# Patient Record
Sex: MALE | Race: WHITE | URBAN - METROPOLITAN AREA
[De-identification: names, ages, dates, MRNs, and addresses within clinical notes are randomized per-mention and may not be internally consistent; named-entity substitution may affect disease eponyms.]

---

## 2019-05-14 ENCOUNTER — OFFICE VISIT (OUTPATIENT)
Dept: INTERNAL MEDICINE CLINIC | Age: 64
End: 2019-05-14

## 2019-05-14 VITALS
TEMPERATURE: 98.4 F | BODY MASS INDEX: 32.37 KG/M2 | OXYGEN SATURATION: 96 % | HEIGHT: 72 IN | HEART RATE: 107 BPM | DIASTOLIC BLOOD PRESSURE: 94 MMHG | WEIGHT: 239 LBS | SYSTOLIC BLOOD PRESSURE: 171 MMHG | RESPIRATION RATE: 16 BRPM

## 2019-05-14 DIAGNOSIS — G89.4 CHRONIC PAIN SYNDROME: ICD-10-CM

## 2019-05-14 DIAGNOSIS — G47.30 SLEEP APNEA, UNSPECIFIED TYPE: ICD-10-CM

## 2019-05-14 DIAGNOSIS — I10 ESSENTIAL HYPERTENSION: ICD-10-CM

## 2019-05-14 DIAGNOSIS — J44.9 CHRONIC OBSTRUCTIVE PULMONARY DISEASE, UNSPECIFIED COPD TYPE (HCC): ICD-10-CM

## 2019-05-14 DIAGNOSIS — L40.9 PSORIASIS: ICD-10-CM

## 2019-05-14 DIAGNOSIS — J44.9 CHRONIC OBSTRUCTIVE PULMONARY DISEASE, UNSPECIFIED COPD TYPE (HCC): Primary | ICD-10-CM

## 2019-05-14 RX ORDER — FLUTICASONE PROPIONATE AND SALMETEROL 250; 50 UG/1; UG/1
1 POWDER RESPIRATORY (INHALATION) EVERY 12 HOURS
Qty: 1 INHALER | Refills: 1 | Status: SHIPPED | OUTPATIENT
Start: 2019-05-14 | End: 2019-07-16 | Stop reason: ALTCHOICE

## 2019-05-14 RX ORDER — FLUTICASONE PROPIONATE AND SALMETEROL 250; 50 UG/1; UG/1
1 POWDER RESPIRATORY (INHALATION) EVERY 12 HOURS
COMMUNITY
End: 2019-05-14 | Stop reason: SDUPTHER

## 2019-05-14 RX ORDER — ALBUTEROL SULFATE 90 UG/1
2 AEROSOL, METERED RESPIRATORY (INHALATION)
COMMUNITY
End: 2019-05-14 | Stop reason: SDUPTHER

## 2019-05-14 RX ORDER — ALBUTEROL SULFATE 90 UG/1
2 AEROSOL, METERED RESPIRATORY (INHALATION)
Qty: 1 INHALER | Refills: 1 | Status: SHIPPED | OUTPATIENT
Start: 2019-05-14 | End: 2019-12-12 | Stop reason: SDUPTHER

## 2019-05-14 RX ORDER — ALBUTEROL SULFATE 0.83 MG/ML
2.5 SOLUTION RESPIRATORY (INHALATION)
COMMUNITY
End: 2019-05-24 | Stop reason: SDUPTHER

## 2019-05-14 RX ORDER — LISINOPRIL 10 MG/1
10 TABLET ORAL DAILY
Qty: 30 TAB | Refills: 1 | Status: SHIPPED | OUTPATIENT
Start: 2019-05-14 | End: 2019-07-16 | Stop reason: SDUPTHER

## 2019-05-14 RX ORDER — FLUTICASONE PROPIONATE AND SALMETEROL 250; 50 UG/1; UG/1
1 POWDER RESPIRATORY (INHALATION) EVERY 12 HOURS
Qty: 1 INHALER | Refills: 1 | Status: SHIPPED | OUTPATIENT
Start: 2019-05-14 | End: 2019-05-14 | Stop reason: SDUPTHER

## 2019-05-14 RX ORDER — ZOLPIDEM TARTRATE 5 MG/1
TABLET ORAL
COMMUNITY
End: 2019-07-16 | Stop reason: ALTCHOICE

## 2019-05-14 RX ORDER — MONTELUKAST SODIUM 10 MG/1
10 TABLET ORAL DAILY
COMMUNITY
End: 2019-05-14 | Stop reason: SDUPTHER

## 2019-05-14 RX ORDER — MONTELUKAST SODIUM 10 MG/1
10 TABLET ORAL DAILY
Qty: 30 TAB | Refills: 1 | Status: SHIPPED | OUTPATIENT
Start: 2019-05-14 | End: 2019-05-14 | Stop reason: SDUPTHER

## 2019-05-14 RX ORDER — LORAZEPAM 0.5 MG/1
0.5 TABLET ORAL DAILY
COMMUNITY
End: 2019-07-16 | Stop reason: ALTCHOICE

## 2019-05-14 RX ORDER — OXYCODONE HYDROCHLORIDE 10 MG/1
10 TABLET ORAL DAILY
COMMUNITY
End: 2019-07-16 | Stop reason: ALTCHOICE

## 2019-05-14 RX ORDER — LISINOPRIL 10 MG/1
10 TABLET ORAL DAILY
COMMUNITY
End: 2019-05-14 | Stop reason: SDUPTHER

## 2019-05-14 RX ORDER — LISINOPRIL 10 MG/1
10 TABLET ORAL DAILY
Qty: 30 TAB | Refills: 1 | Status: SHIPPED | OUTPATIENT
Start: 2019-05-14 | End: 2019-08-08 | Stop reason: SDUPTHER

## 2019-05-14 RX ORDER — ALBUTEROL SULFATE 90 UG/1
2 AEROSOL, METERED RESPIRATORY (INHALATION)
Qty: 1 INHALER | Refills: 1 | Status: SHIPPED | OUTPATIENT
Start: 2019-05-14 | End: 2019-05-14 | Stop reason: SDUPTHER

## 2019-05-14 RX ORDER — MONTELUKAST SODIUM 10 MG/1
10 TABLET ORAL DAILY
Qty: 30 TAB | Refills: 1 | Status: SHIPPED | OUTPATIENT
Start: 2019-05-14 | End: 2019-08-08 | Stop reason: SDUPTHER

## 2019-05-14 NOTE — PROGRESS NOTES
Patient coming to this office from [place] and [place], will call back with information, this office close to home, sister Ananda Amaya) in room with patient. Patient has been off BP meds for 1 year. Patient takes Triamcinolone cream, does not know the strength or cream/lotion. Need Pulmonologist, lungs are bad.  Needs a Dermatologist.

## 2019-05-14 NOTE — TELEPHONE ENCOUNTER
Regina Veliz (sister) is requesting a her brothers Rx's be switched to [facility] [phone]). Best contact is [phone].      jan

## 2019-05-15 NOTE — PROGRESS NOTES
HISTORY OF PRESENT ILLNESS  Celso Brar is a 60 y.o. male. HPI  Patient presents to the office to get established. He has been here in [place] for about one month. He was living in [place] but he no longer had a support system so he moved to [place] with his sister. Before living in [place] he had lived in [place]. He is here today with his sister. He shares he is in the process of getting health insurance here in [place]. He has a history of \"bad lungs\". He will need a pulmonologist here. He reports he has chronic pain all over. He will need a pain specialist as well. Mr. Isamar Guerrero has a number of medications that will need refilling. He has been without his blood pressure medication for sometime. He is out of his COPD medication as well. He reports he has some anxiety each morning. He takes anxiety medication as well. He is looking to get his medication refilled today.   No Known Allergies  Past Medical History:   Diagnosis Date    [facility] 2001    COPD (chronic obstructive pulmonary disease) ([facility]     patient is currently on oxygen    Psoriasis      Social History     Socioeconomic History    Marital status: UNKNOWN     Spouse name: Not on file    Number of children: Not on file    Years of education: Not on file    Highest education level: Not on file   Tobacco Use    Smoking status: Current Every Day Smoker     Types: Cigarettes    Smokeless tobacco: Never Used    Tobacco comment: 1-2 cigarettes a day, use to smoke up to 2 packs daily   Substance and Sexual Activity    Alcohol use: Yes     Frequency: Monthly or less     Drinks per session: 1 or 2     Binge frequency: Less than monthly     Comment: beer    Drug use: Yes     Types: Marijuana     Comment: eats marijuana cookies    Sexual activity: Not Currently     Family History   Problem Relation Age of Onset    Lung Cancer Mother         smoker    Heart Attack Mother 64    Hypertension Mother     Hypertension Sister     Stroke Maternal Grandmother     Colon Cancer Maternal Aunt      Past Surgical History:   Procedure Laterality Date    HX COLECTOMY  2001       Review of Systems   Respiratory: Positive for cough and shortness of breath. Cardiovascular: Negative for chest pain and leg swelling. Musculoskeletal: Positive for joint pain. Psychiatric/Behavioral: The patient is nervous/anxious and has insomnia. Blood pressure (!) 171/94, pulse (!) 107, temperature 98.4 °F (36.9 °C), temperature source Oral, resp. rate 16, height 6' (1.829 m), weight 239 lb (108.4 kg), SpO2 96 %. Physical Exam   Constitutional: No distress. Wearing oxygen   Cardiovascular: Normal rate and regular rhythm. Pulmonary/Chest:   Decreased breath sounds. Skin:   Scaling plaques noted on the extensor surfaces of upper extremities   Psychiatric: He has a normal mood and affect. His behavior is normal. Judgment and thought content normal.       ASSESSMENT and PLAN  Diagnoses and all orders for this visit:    1. Chronic obstructive pulmonary disease, unspecified COPD type ([facility]  -     REFERRAL TO PULMONARY DISEASE    2. Essential hypertension  -     lisinopril (PRINIVIL, ZESTRIL) 10 mg tablet; Take 1 Tab by mouth daily. 3. Chronic pain syndrome  -     REFERRAL TO PAIN MANAGEMENT    4. Sleep apnea, unspecified type  -     REFERRAL TO PULMONARY DISEASE    5. Psoriasis    I have explained to the patient and his sister that I am concerned about a number of things about him. He is here today with no previous medical records, although he has an extensive history. He has shared that he has not liked his last providers in [place] or [place]. He is on controlled medication that I will not prescribe today. He has shared that the last doctor wanted him to take a drug test but he would not do it. I have tried explaining to him that our office no longer takes care of chronic pain.  Chronic pain patients are sent to pain specialist. He proceed to say that he can get it on the street if needed. His sister then says I don't know how you would do that because you never leave the house. I have given him a referral to pulmonology and pain management. He does not have insurance right now so I have suggested he check to see if he can do a payment plan. I will try to get medical records on this patient. I have refilled his copd medication and his blood pressure medication. I would like to speak with Dr. Kirk Marie about this patient because he should be followed by a physician in my opinion. All this was discussed with the patient and he understands and agrees.

## 2019-05-23 ENCOUNTER — TELEPHONE (OUTPATIENT)
Dept: INTERNAL MEDICINE CLINIC | Age: 64
End: 2019-05-23

## 2019-05-23 NOTE — TELEPHONE ENCOUNTER
Mortimer Cower is calling concerning a PA for Mr. Wolfe Rubinstein. She can be reached at [phone].  Thank you

## 2019-05-24 RX ORDER — ALBUTEROL SULFATE 0.83 MG/ML
2.5 SOLUTION RESPIRATORY (INHALATION)
Qty: 24 EACH | Refills: 5 | Status: SHIPPED | OUTPATIENT
Start: 2019-05-24 | End: 2019-12-12 | Stop reason: SDUPTHER

## 2019-06-04 ENCOUNTER — TELEPHONE (OUTPATIENT)
Dept: INTERNAL MEDICINE CLINIC | Age: 64
End: 2019-06-04

## 2019-06-04 DIAGNOSIS — J44.9 CHRONIC OBSTRUCTIVE PULMONARY DISEASE, UNSPECIFIED COPD TYPE (HCC): Primary | ICD-10-CM

## 2019-06-04 NOTE — TELEPHONE ENCOUNTER
Does he have an appointment with pulmonologist? I have sent atrovent.  He has severe COPD he really need to see the pulmonologist ASAP thanks

## 2019-06-04 NOTE — TELEPHONE ENCOUNTER
Trenton Holliday with [employer] is calling regarding Rx Spiriva. The insurance will not cover this. Requesting a preferred  medication, Rx Atrovent inhaler or Rx Incruse Ellipta inhaler, to be called in to [facility] on file at [phone].       envvkd

## 2019-06-04 NOTE — TELEPHONE ENCOUNTER
Aidan Liao, [employer], is reaching out regarding the Rx for \"Spiriva\" which is not covered, requesting to be switched to \"Atrovent or Incruse Ellipta\" which are covered.      Authorization # [id number]     Best contact # [phone]

## 2019-06-04 NOTE — TELEPHONE ENCOUNTER
Writer contacted [address] [place] [address] to inform of replacement medication for Spiriva sent due to insurance denial and to inquire about Pulmonologist per Dianna Cantu verbalized understanding and they are waiting for Medicaid to be approved before they can make appointment with Pulmonologist.

## 2019-07-16 ENCOUNTER — OFFICE VISIT (OUTPATIENT)
Dept: INTERNAL MEDICINE CLINIC | Age: 64
End: 2019-07-16

## 2019-07-16 VITALS
TEMPERATURE: 96 F | BODY MASS INDEX: 33.46 KG/M2 | SYSTOLIC BLOOD PRESSURE: 145 MMHG | OXYGEN SATURATION: 97 % | HEART RATE: 67 BPM | WEIGHT: 247 LBS | RESPIRATION RATE: 18 BRPM | HEIGHT: 72 IN | DIASTOLIC BLOOD PRESSURE: 84 MMHG

## 2019-07-16 DIAGNOSIS — J44.9 COPD MIXED TYPE (HCC): ICD-10-CM

## 2019-07-16 DIAGNOSIS — L40.9 PSORIASIS: ICD-10-CM

## 2019-07-16 DIAGNOSIS — I10 ESSENTIAL HYPERTENSION: Primary | ICD-10-CM

## 2019-07-16 DIAGNOSIS — M13.0 POLYARTHRITIS: ICD-10-CM

## 2019-07-16 RX ORDER — BUPROPION HYDROCHLORIDE 150 MG/1
150 TABLET ORAL
Qty: 30 TAB | Refills: 5 | Status: SHIPPED | OUTPATIENT
Start: 2019-07-16 | End: 2019-12-12 | Stop reason: ALTCHOICE

## 2019-07-16 RX ORDER — BETAMETHASONE DIPROPIONATE 0.5 MG/G
OINTMENT TOPICAL 2 TIMES DAILY
Qty: 90 G | Refills: 5 | Status: SHIPPED | OUTPATIENT
Start: 2019-07-16 | End: 2019-09-16 | Stop reason: SDUPTHER

## 2019-07-16 RX ORDER — HYDROCORTISONE 25 MG/G
CREAM TOPICAL 2 TIMES DAILY
Qty: 60 G | Refills: 0 | Status: SHIPPED | OUTPATIENT
Start: 2019-07-16 | End: 2019-09-15 | Stop reason: SDUPTHER

## 2019-07-16 NOTE — PROGRESS NOTES
Seen by PA once  No records available saw Pulmonary    Subjective:  Mr. Jennifer Gamble is a 60year old gentleman who has been in this office once. The family has tried to get him a dermatologist, but unfortunately only two dermatologists were listed on his Medicaid and one first appointment was in December. Patient moved to [place] [address] to live with his sister and brother in law. Unfortunately he was not doing well, was down and out living in [place]. He has severe COPD, oxygen dependent, 4 liters, still smokes cigarettes, one or two a day. He has very bad psoriasis and he has pain. He [place] pain meds. He has chronic anxiety in addition. He said he gets very anxious when he cannot breathe, but he still smokes. He has some depression. He says that he used to use Triamcinolone ointment on his skin and that used to help. He has never been on biologics. He complains of joint pain in his hands, elbows, shoulders, knees and feet. The psoriasis has been much worse lately. He has a history that when he was on prednisone about a year ago for his COPD flare his psoriasis got a lot better. His other meds are not 100% clear. [facility] he now has Trelegy, although insurance may not cover it. He has a Proventil inhaler, which he says is not as good as Ventolin. It is unclear whether he is taking Singulair or antihypertensive. Once again, no records are available. Physical Examination:  His BP was as noted. His chest showed expiratory wheeze that is minimal.  He has 4 liters of oxygen running. He was somewhat disheveled with long hair and unkempt beard. He was somewhat demanding. He had very severe plaque-like psoriasis over arms and legs, mostly thighs and upper arms down to his hands. He had nail pitting.   He did not have any sausage fingers, did not have any MCP joint swelling, did not have DIP or PIP joint swelling in his hands.    Plan:  1. I have ordered baseline lab work, including sed rate, CRP and labs. 2. Brother in law will help get records. 3. I told him that I will help him with his psoriasis. He does need to see dermatology and I would like him to see dermatology. I wonder if his pain is related to psoriatic arthritis. He will be started on a Diprolene type ointment and Hitone on the face. 4. He will be seen back in 2-3 months. 5. I have added Wellbutrin 150 mg time release for anxiety/depression and it may help him quit smoking. Vitals:    07/16/19 1623   BP: 145/84   Pulse: 67   Resp: 18   Temp: 96 °F (35.6 °C)   TempSrc: Oral   SpO2: 97%   Weight: 247 lb (112 kg)   Height: 6' (1.829 m)     Prolonged visit with 15 minutes of time out  of more than a  25 minute visit spent counseling patient and family and formulation of care  No benzo  No narcotics    Diagnoses and all orders for this visit:    1. Essential hypertension  -     LIPID PANEL    2. COPD mixed type (HCC)    3. Psoriasis  -     CBC WITH AUTOMATED DIFF  -     METABOLIC PANEL, COMPREHENSIVE  -     SED RATE (ESR); Future  -     MAGNESIUM  -     C REACTIVE PROTEIN, QT    4. Polyarthritis    Other orders  -     betamethasone dipropionate (DIPROLENE) 0.05 % ointment; Apply  to affected area two (2) times a day. -     hydrocortisone (HYTONE) 2.5 % topical cream; Apply  to affected area two (2) times a day. use thin layer on face  -     buPROPion XL (WELLBUTRIN XL) 150 mg tablet; Take 1 Tab by mouth every morning.  Indications: Anxiousness associated with Depression

## 2019-07-16 NOTE — PROGRESS NOTES
1. Have you been to the ER, urgent care clinic since your last visit? Hospitalized since your last visit? No    2. Have you seen or consulted any other health care providers outside of the [address] [place] [address] since your last visit? Include any pap smears or colon screening.  [facility]

## 2019-08-08 DIAGNOSIS — J44.9 CHRONIC OBSTRUCTIVE PULMONARY DISEASE, UNSPECIFIED COPD TYPE (HCC): ICD-10-CM

## 2019-08-09 RX ORDER — LISINOPRIL 10 MG/1
TABLET ORAL
Qty: 30 TAB | Refills: 1 | Status: SHIPPED | OUTPATIENT
Start: 2019-08-09 | End: 2019-10-12 | Stop reason: SDUPTHER

## 2019-08-09 RX ORDER — MONTELUKAST SODIUM 10 MG/1
TABLET ORAL
Qty: 30 TAB | Refills: 1 | Status: SHIPPED | OUTPATIENT
Start: 2019-08-09 | End: 2019-10-12 | Stop reason: SDUPTHER

## 2019-09-15 RX ORDER — HYDROCORTISONE 25 MG/G
CREAM TOPICAL
Qty: 60 G | Refills: 1 | Status: SHIPPED | OUTPATIENT
Start: 2019-09-15 | End: 2019-09-16 | Stop reason: SDUPTHER

## 2019-09-16 ENCOUNTER — OFFICE VISIT (OUTPATIENT)
Dept: INTERNAL MEDICINE CLINIC | Age: 64
End: 2019-09-16

## 2019-09-16 VITALS
HEART RATE: 68 BPM | DIASTOLIC BLOOD PRESSURE: 78 MMHG | OXYGEN SATURATION: 96 % | WEIGHT: 244 LBS | SYSTOLIC BLOOD PRESSURE: 142 MMHG | HEIGHT: 72 IN | BODY MASS INDEX: 33.05 KG/M2 | TEMPERATURE: 97.5 F | RESPIRATION RATE: 24 BRPM

## 2019-09-16 DIAGNOSIS — F41.9 ANXIETY: ICD-10-CM

## 2019-09-16 DIAGNOSIS — I10 ESSENTIAL HYPERTENSION: ICD-10-CM

## 2019-09-16 DIAGNOSIS — J44.9 CHRONIC OBSTRUCTIVE PULMONARY DISEASE, UNSPECIFIED COPD TYPE (HCC): Primary | ICD-10-CM

## 2019-09-16 DIAGNOSIS — L30.9 DERMATITIS: ICD-10-CM

## 2019-09-16 DIAGNOSIS — L40.9 PSORIASIS: ICD-10-CM

## 2019-09-16 RX ORDER — BETAMETHASONE DIPROPIONATE 0.5 MG/G
OINTMENT TOPICAL 2 TIMES DAILY
Qty: 90 G | Refills: 5 | Status: SHIPPED | OUTPATIENT
Start: 2019-09-16 | End: 2020-03-26 | Stop reason: SDUPTHER

## 2019-09-16 RX ORDER — HYDROCORTISONE 25 MG/G
CREAM TOPICAL
Qty: 60 G | Refills: 3 | Status: SHIPPED | OUTPATIENT
Start: 2019-09-16 | End: 2019-12-12 | Stop reason: SDUPTHER

## 2019-09-16 NOTE — PROGRESS NOTES
Seen by PA once  No records available saw Pulmonary    Subjective:  Mr. Isidro Martin is a 60year old gentleman who has been in this office once. The family has tried to get him a dermatologist, but unfortunately only two dermatologists were listed on his Medicaid and one first appointment was in December. Patient moved to [place] [address] to live with his sister and brother in law. Unfortunately he was not doing well, was down and out living in [address] [place] [address]. He has severe COPD, oxygen dependent, 4 liters, still smokes cigarettes, one or two a day. He has very bad psoriasis and he has pain. He [place] pain meds. He has chronic anxiety in addition. He said he gets very anxious when he cannot breathe, but he still smokes. He has some depression. He says that he used to use Triamcinolone ointment on his skin and that used to help. He has never been on biologics. He complains of joint pain in his hands, elbows, shoulders, knees and feet. The psoriasis has been much worse lately. He has a history that when he was on prednisone about a year ago for his COPD flare his psoriasis got a lot better. Doing better less anxiety    Skin better with topical steroids  Has not seen dermatology  Down to 1 or 2 cigarettes per day  Brought pulmonary records  Has not done labs yet      Vitals:    09/16/19 1403   BP: 142/78   Pulse: 68   Resp: 24   Temp: 97.5 °F (36.4 °C)   TempSrc: Oral   SpO2: 96%   Weight: 244 lb (110.7 kg)   Height: 6' (1.829 m)     Facial rash still  Dermatitis on arms less  Feet swollen  Nasal O2 at 5 liters    1. Chronic obstructive pulmonary disease, unspecified COPD type ([address]  unchanged    2. Essential hypertension  fair  Get labs done  3. Dermatitis  A little better    4. Psoriasis  See DERM    5. Anxiety  Continue wellbutrin      .

## 2019-09-16 NOTE — PROGRESS NOTES
1. Have you been to the ER, urgent care clinic since your last visit? Hospitalized since your last visit? No    2. Have you seen or consulted any other health care providers outside of the [address] [place] [address] since your last visit? Include any pap smears or colon screening. Yes.   Dr Estela Johnson

## 2019-09-17 LAB
ALBUMIN SERPL-MCNC: 4.4 G/DL (ref 3.6–4.8)
ALBUMIN/GLOB SERPL: 2 {RATIO} (ref 1.2–2.2)
ALP SERPL-CCNC: 62 IU/L (ref 39–117)
ALT SERPL-CCNC: 14 IU/L (ref 0–44)
AST SERPL-CCNC: 12 IU/L (ref 0–40)
BASOPHILS # BLD AUTO: 0 X10E3/UL (ref 0–0.2)
BASOPHILS NFR BLD AUTO: 1 %
BILIRUB SERPL-MCNC: 0.6 MG/DL (ref 0–1.2)
BUN SERPL-MCNC: 19 MG/DL (ref 8–27)
BUN/CREAT SERPL: 18 (ref 10–24)
CALCIUM SERPL-MCNC: 9.2 MG/DL (ref 8.6–10.2)
CHLORIDE SERPL-SCNC: 99 MMOL/L (ref 96–106)
CHOLEST SERPL-MCNC: 155 MG/DL (ref 100–199)
CO2 SERPL-SCNC: 31 MMOL/L (ref 20–29)
CREAT SERPL-MCNC: 1.07 MG/DL (ref 0.76–1.27)
CRP SERPL-MCNC: 1 MG/L (ref 0–10)
EOSINOPHIL # BLD AUTO: 0.2 X10E3/UL (ref 0–0.4)
EOSINOPHIL NFR BLD AUTO: 3 %
ERYTHROCYTE [DISTWIDTH] IN BLOOD BY AUTOMATED COUNT: 12.8 % (ref 12.3–15.4)
ERYTHROCYTE [SEDIMENTATION RATE] IN BLOOD BY WESTERGREN METHOD: 20 MM/HR (ref 0–30)
GLOBULIN SER CALC-MCNC: 2.2 G/DL (ref 1.5–4.5)
GLUCOSE SERPL-MCNC: 80 MG/DL (ref 65–99)
HCT VFR BLD AUTO: 41.3 % (ref 37.5–51)
HDLC SERPL-MCNC: 50 MG/DL
HGB BLD-MCNC: 13.3 G/DL (ref 13–17.7)
IMM GRANULOCYTES # BLD AUTO: 0 X10E3/UL (ref 0–0.1)
IMM GRANULOCYTES NFR BLD AUTO: 0 %
INTERPRETATION, 910389: NORMAL
LDLC SERPL CALC-MCNC: 93 MG/DL (ref 0–99)
LYMPHOCYTES # BLD AUTO: 1.2 X10E3/UL (ref 0.7–3.1)
LYMPHOCYTES NFR BLD AUTO: 20 %
MAGNESIUM SERPL-MCNC: 1.9 MG/DL (ref 1.6–2.3)
MCH RBC QN AUTO: 31.6 PG (ref 26.6–33)
MCHC RBC AUTO-ENTMCNC: 32.2 G/DL (ref 31.5–35.7)
MCV RBC AUTO: 98 FL (ref 79–97)
MONOCYTES # BLD AUTO: 0.6 X10E3/UL (ref 0.1–0.9)
MONOCYTES NFR BLD AUTO: 9 %
NEUTROPHILS # BLD AUTO: 4.1 X10E3/UL (ref 1.4–7)
NEUTROPHILS NFR BLD AUTO: 67 %
PLATELET # BLD AUTO: 151 X10E3/UL (ref 150–450)
POTASSIUM SERPL-SCNC: 4.2 MMOL/L (ref 3.5–5.2)
PROT SERPL-MCNC: 6.6 G/DL (ref 6–8.5)
RBC # BLD AUTO: 4.21 X10E6/UL (ref 4.14–5.8)
SODIUM SERPL-SCNC: 144 MMOL/L (ref 134–144)
TRIGL SERPL-MCNC: 61 MG/DL (ref 0–149)
VLDLC SERPL CALC-MCNC: 12 MG/DL (ref 5–40)
WBC # BLD AUTO: 6.1 X10E3/UL (ref 3.4–10.8)

## 2019-10-12 DIAGNOSIS — J44.9 CHRONIC OBSTRUCTIVE PULMONARY DISEASE, UNSPECIFIED COPD TYPE (HCC): ICD-10-CM

## 2019-10-12 RX ORDER — LISINOPRIL 10 MG/1
TABLET ORAL
Qty: 30 TAB | Refills: 1 | Status: SHIPPED | OUTPATIENT
Start: 2019-10-12 | End: 2019-12-12 | Stop reason: SDUPTHER

## 2019-10-12 RX ORDER — MONTELUKAST SODIUM 10 MG/1
TABLET ORAL
Qty: 30 TAB | Refills: 1 | Status: SHIPPED | OUTPATIENT
Start: 2019-10-12 | End: 2019-12-12 | Stop reason: SDUPTHER

## 2019-12-12 ENCOUNTER — OFFICE VISIT (OUTPATIENT)
Dept: INTERNAL MEDICINE CLINIC | Age: 64
End: 2019-12-12

## 2019-12-12 VITALS
TEMPERATURE: 97.1 F | OXYGEN SATURATION: 93 % | HEIGHT: 72 IN | WEIGHT: 251 LBS | DIASTOLIC BLOOD PRESSURE: 93 MMHG | SYSTOLIC BLOOD PRESSURE: 179 MMHG | BODY MASS INDEX: 34 KG/M2 | RESPIRATION RATE: 28 BRPM | HEART RATE: 88 BPM

## 2019-12-12 DIAGNOSIS — J44.9 CHRONIC OBSTRUCTIVE PULMONARY DISEASE, UNSPECIFIED COPD TYPE (HCC): ICD-10-CM

## 2019-12-12 DIAGNOSIS — L40.9 PSORIASIS: Primary | ICD-10-CM

## 2019-12-12 DIAGNOSIS — F41.9 ANXIETY: ICD-10-CM

## 2019-12-12 DIAGNOSIS — M13.0 POLYARTHRITIS: ICD-10-CM

## 2019-12-12 DIAGNOSIS — I10 ESSENTIAL HYPERTENSION: ICD-10-CM

## 2019-12-12 RX ORDER — SERTRALINE HYDROCHLORIDE 50 MG/1
50 TABLET, FILM COATED ORAL DAILY
Qty: 90 TAB | Refills: 1 | Status: SHIPPED | OUTPATIENT
Start: 2019-12-12 | End: 2020-06-03

## 2019-12-12 RX ORDER — HYDROCORTISONE 25 MG/G
CREAM TOPICAL
Qty: 60 G | Refills: 3 | Status: SHIPPED | OUTPATIENT
Start: 2019-12-12 | End: 2020-03-26 | Stop reason: SDUPTHER

## 2019-12-12 RX ORDER — ALBUTEROL SULFATE 90 UG/1
2 AEROSOL, METERED RESPIRATORY (INHALATION)
Qty: 2 INHALER | Refills: 3 | Status: SHIPPED | OUTPATIENT
Start: 2019-12-12

## 2019-12-12 RX ORDER — MONTELUKAST SODIUM 10 MG/1
TABLET ORAL
Qty: 30 TAB | Refills: 1 | Status: SHIPPED | OUTPATIENT
Start: 2019-12-12 | End: 2020-02-18

## 2019-12-12 RX ORDER — LISINOPRIL 20 MG/1
TABLET ORAL
Qty: 90 TAB | Refills: 1 | Status: SHIPPED | OUTPATIENT
Start: 2019-12-12 | End: 2020-06-03

## 2019-12-12 RX ORDER — ALBUTEROL SULFATE 0.83 MG/ML
2.5 SOLUTION RESPIRATORY (INHALATION)
Qty: 100 EACH | Refills: 5 | Status: SHIPPED | OUTPATIENT
Start: 2019-12-12 | End: 2020-11-25 | Stop reason: SDUPTHER

## 2019-12-12 NOTE — PROGRESS NOTES
1. Have you been to the ER, urgent care clinic since your last visit? Hospitalized since your last visit? No    2. Have you seen or consulted any other health care providers outside of the [address] [place] [address] since your last visit? Include any pap smears or colon screening.  Dr Paul Dorsey: pulmonary

## 2019-12-13 NOTE — PROGRESS NOTES
Seen by PA once  No records available saw Pulmonary    Subjective:  Mr. Kari Cordero is a 60year old gentleman who has been in this office a few times. The family has tried to get him a dermatologist, but unfortunately only two dermatologists were listed on his Medicaid and one first appointment was in December. Patient moved to [place] [address] to live with his sister and brother in law. Unfortunately he was not doing well, was down and out living in [place]. He has severe COPD, oxygen dependent, 5 liters, off cigarettes for 3 weeks. He has very bad psoriasis and he has pain. He says [place] pain meds. He has chronic anxiety in addition. He said he gets very anxious when he cannot breathe, but he still smokes. He has some depression. He says that he used to use Triamcinolone ointment on his skin and that used to help. He has never been on biologics. He complains of joint pain in his hands, elbows, shoulders, knees and feet. The psoriasis has been much worse lately. He has a history that when he was on prednisone about a year ago for his COPD flare his psoriasis got a lot better. His other meds are not 100% clear. [facility] he now has Trelegy, although insurance may not cover it. He has a Proventil inhaler, which he says is not as good as Ventolin. It is unclear whether he is taking Singulair or antihypertensive. Once again, no records are available. Vitals:    12/12/19 1512   BP: (!) 179/93   Pulse: 88   Resp: 28   Temp: 97.1 °F (36.2 °C)   SpO2: 93%   Weight: 251 lb (113.9 kg)   Height: 6' (1.829 m)       Physical Examination:  His BP was as noted. His chest showed expiratory wheeze that is minimal.  He has 4 liters of oxygen running. He was somewhat disheveled with long hair and unkempt beard. He was somewhat demanding.   He had very severe plaque-like psoriasis over arms and legs, mostly thighs and upper arms down to his hands.  He had nail pitting. He did not have any sausage fingers, did not have any MCP joint swelling, did not have DIP or PIP joint swelling in his hands. scaly rash on arms    Plan:  Vitals:    12/12/19 1512   BP: (!) 179/93   Pulse: 88   Resp: 28   Temp: 97.1 °F (36.2 °C)   SpO2: 93%   Weight: 251 lb (113.9 kg)   Height: 6' (1.829 m)     Prolonged visit with 15 minutes of time out  of more than a  25 minute visit spent counseling patient and family and formulation of care  No benzo  No narcotics    Diagnoses and all orders for this visit:    1. Chronic obstructive pulmonary disease, unspecified COPD type (HCC)  -     albuterol (VENTOLIN HFA) 90 mcg/actuation inhaler; Take 2 Puffs by inhalation every four (4) hours as needed for Wheezing.  -     montelukast (SINGULAIR) 10 mg tablet; TAKE 1 TABLET BY MOUTH ONCE DAILY    Other orders  -     lisinopril (PRINIVIL, ZESTRIL) 20 mg tablet; TAKE 1 TABLET BY MOUTH ONCE DAILY  -     hydrocortisone (HYTONE) 2.5 % topical cream; Apply  to affected area two (2) times daily as needed (rash). use thin layer  -     sertraline (ZOLOFT) 50 mg tablet; Take 1 Tab by mouth daily. -     albuterol (PROVENTIL VENTOLIN) 2.5 mg /3 mL (0.083 %) nebu; 3 mL by Nebulization route every four (4) hours as needed for Wheezing. 1. Chronic obstructive pulmonary disease, unspecified COPD type (HCC)  refills  - albuterol (VENTOLIN HFA) 90 mcg/actuation inhaler; Take 2 Puffs by inhalation every four (4) hours as needed for Wheezing. Dispense: 2 Inhaler; Refill: 3  - montelukast (SINGULAIR) 10 mg tablet; TAKE 1 TABLET BY MOUTH ONCE DAILY  Dispense: 30 Tab; Refill: 1    2. Psoriasis  Has not seen dermatology  Refill steroid creams    3. Polyarthritis      4. Essential hypertension  Poor will double ace dose    5.  Anxiety  chronic

## 2020-02-18 DIAGNOSIS — J44.9 CHRONIC OBSTRUCTIVE PULMONARY DISEASE, UNSPECIFIED COPD TYPE (HCC): ICD-10-CM

## 2020-02-18 RX ORDER — MONTELUKAST SODIUM 10 MG/1
TABLET ORAL
Qty: 30 TAB | Refills: 0 | Status: SHIPPED | OUTPATIENT
Start: 2020-02-18 | End: 2020-03-25

## 2020-03-24 DIAGNOSIS — J44.9 CHRONIC OBSTRUCTIVE PULMONARY DISEASE, UNSPECIFIED COPD TYPE (HCC): ICD-10-CM

## 2020-03-25 ENCOUNTER — TELEPHONE (OUTPATIENT)
Dept: INTERNAL MEDICINE CLINIC | Age: 65
End: 2020-03-25

## 2020-03-25 RX ORDER — MONTELUKAST SODIUM 10 MG/1
TABLET ORAL
Qty: 30 TAB | Refills: 0 | Status: SHIPPED | OUTPATIENT
Start: 2020-03-25 | End: 2020-03-26 | Stop reason: SDUPTHER

## 2020-03-25 NOTE — TELEPHONE ENCOUNTER
Phone Number:  [phone] (Call me)             Please contact Onofre Mercado, to help her set up the Virtual Appointment for the patient.

## 2020-03-26 ENCOUNTER — VIRTUAL VISIT (OUTPATIENT)
Dept: INTERNAL MEDICINE CLINIC | Age: 65
End: 2020-03-26

## 2020-03-26 VITALS — SYSTOLIC BLOOD PRESSURE: 138 MMHG | OXYGEN SATURATION: 95 % | HEART RATE: 63 BPM | DIASTOLIC BLOOD PRESSURE: 70 MMHG

## 2020-03-26 DIAGNOSIS — F41.9 ANXIETY: ICD-10-CM

## 2020-03-26 DIAGNOSIS — J44.9 CHRONIC OBSTRUCTIVE PULMONARY DISEASE, UNSPECIFIED COPD TYPE (HCC): ICD-10-CM

## 2020-03-26 DIAGNOSIS — J44.9 COPD MIXED TYPE (HCC): Primary | ICD-10-CM

## 2020-03-26 DIAGNOSIS — L40.9 PSORIASIS: ICD-10-CM

## 2020-03-26 DIAGNOSIS — I10 ESSENTIAL HYPERTENSION: ICD-10-CM

## 2020-03-26 RX ORDER — BETAMETHASONE DIPROPIONATE 0.5 MG/G
OINTMENT TOPICAL 2 TIMES DAILY
Qty: 90 G | Refills: 1 | Status: SHIPPED | OUTPATIENT
Start: 2020-03-26 | End: 2020-12-22

## 2020-03-26 RX ORDER — MONTELUKAST SODIUM 10 MG/1
10 TABLET ORAL DAILY
Qty: 90 TAB | Refills: 0 | Status: SHIPPED | OUTPATIENT
Start: 2020-03-26 | End: 2020-08-24

## 2020-03-26 RX ORDER — HYDROCORTISONE 25 MG/G
CREAM TOPICAL
Qty: 60 G | Refills: 3 | Status: SHIPPED | OUTPATIENT
Start: 2020-03-26 | End: 2021-04-06 | Stop reason: SDUPTHER

## 2020-03-26 NOTE — PROGRESS NOTES
THIS IS A VIRTUAL VISIT USING [employer] ME SOFTWARE    Subjective:  Mr. Magi Orona is a 60year old gentleman who has been in this office a few times. The family has tried to get him a dermatologist, but unfortunately only two dermatologists were listed on his Medicaid and one first appointment was in December. Patient moved to [place] [address] to live with his sister and brother in law. Unfortunately he was not doing well, was down and out living in [place]. He has severe COPD, oxygen dependent, 5 liters, off cigarettes for 3 weeks. He has very bad psoriasis and he has pain. He says [place] pain meds. He has chronic anxiety in addition. He said he gets very anxious when he cannot breathe, but he still smokes. He has some depression. He says that he used to use Triamcinolone ointment on his skin and that used to help. He has never been on biologics. He complains of joint pain in his hands, elbows, shoulders, knees and feet. The psoriasis has been much worse lately. He has a history that when he was on prednisone about a year ago for his COPD flare his psoriasis got a lot better. His other meds are not 100% clear. [facility] he now has Trelegy, although insurance may not cover it. He has a Proventil inhaler, which he says is not as good as Ventolin. .  Once again, no records are available.   He notes he did visit yesterday with DR Nona Reilly from pulmonary  Patient Active Problem List    Diagnosis    COPD mixed type ([facility]     4 liters o2      Psoriasis    Polyarthritis       Cardiovascular ROS: no chest pain or  Palpitation  Less couugfh  Neurological ROS: no TIA or stroke symptoms  General ROS: negative for - chills, fatigue, fever, malaise, night sweats or sleep disturbance  Endocrine ROS: negative for - hair pattern changes, hot flashes, malaise/lethargy, palpitations, polydipsia/polyuria, temperature intolerance or unexpected weight changes      Vitals:    03/26/20 1520   BP: 138/80   SpO2: 95%   no apparent distress  Edema mild    Plan:  Prolonged visit with 15 minutes of time out  of more than a  25 minute visit spent counseling patient and family and formulation of care  No benzo  No narcotics    1. Chronic obstructive pulmonary disease, unspecified COPD type (HCC)  refills  - albuterol (VENTOLIN HFA) 90 mcg/actuation inhaler; Take 2 Puffs by inhalation every four (4) hours as needed for Wheezing. Dispense: 2 Inhaler; Refill: 3  - montelukast (SINGULAIR) 10 mg tablet; TAKE 1 TABLET BY MOUTH ONCE DAILY  Dispense: 30 Tab; Refill: 1  Off tobacco  2. Psoriasis  Has not seen dermatology  Refill steroid creams    3. Polyarthritis  unchaged    4. Essential hypertension  better    5. Anxiety  chronic  Lab Results   Component Value Date/Time    GFR est AA 86 09/16/2019 02:37 PM    GFR est non-AA 74 09/16/2019 02:37 PM    Creatinine 1.07 09/16/2019 02:37 PM    BUN 19 09/16/2019 02:37 PM    Sodium 144 09/16/2019 02:37 PM    Potassium 4.2 09/16/2019 02:37 PM    Chloride 99 09/16/2019 02:37 PM    CO2 31 (H) 09/16/2019 02:37 PM     Lab Results   Component Value Date/Time    WBC 6.1 09/16/2019 02:37 PM    HGB 13.3 09/16/2019 02:37 PM    HCT 41.3 09/16/2019 02:37 PM    PLATELET 954 91/33/3967 02:37 PM    MCV 98 (H) 09/16/2019 02:37 PM     Labs in 3 months      Prolonged visit with 15 minutes of time out  of more than a  25 minute visit spent counseling patient and family and formulation of care and doing all the pain medication regulatory requirements  Diagnoses and all orders for this visit:    1. COPD mixed type ([address]    2. Psoriasis    3. Essential hypertension    4. Chronic obstructive pulmonary disease, unspecified COPD type (HCC)  -     montelukast (SINGULAIR) 10 mg tablet; Take 1 Tab by mouth daily. 5. Anxiety    Other orders  -     hydrocortisone (HYTONE) 2.5 % topical cream; Apply  to affected area two (2) times daily as needed (rash).  use thin layer  -     betamethasone dipropionate (DIPROLENE) 0.05 % ointment; Apply  to affected area two (2) times a day.

## 2020-08-24 DIAGNOSIS — J44.9 CHRONIC OBSTRUCTIVE PULMONARY DISEASE, UNSPECIFIED COPD TYPE (HCC): ICD-10-CM

## 2020-08-24 RX ORDER — MONTELUKAST SODIUM 10 MG/1
TABLET ORAL
Qty: 90 TAB | Refills: 0 | Status: SHIPPED | OUTPATIENT
Start: 2020-08-24 | End: 2020-10-14 | Stop reason: SDUPTHER

## 2020-10-14 ENCOUNTER — VIRTUAL VISIT (OUTPATIENT)
Dept: INTERNAL MEDICINE CLINIC | Age: 65
End: 2020-10-14
Payer: COMMERCIAL

## 2020-10-14 DIAGNOSIS — I10 ESSENTIAL HYPERTENSION: ICD-10-CM

## 2020-10-14 DIAGNOSIS — F34.1 DYSTHYMIA: Primary | ICD-10-CM

## 2020-10-14 DIAGNOSIS — J44.9 CHRONIC OBSTRUCTIVE PULMONARY DISEASE, UNSPECIFIED COPD TYPE (HCC): ICD-10-CM

## 2020-10-14 DIAGNOSIS — J44.9 COPD MIXED TYPE (HCC): ICD-10-CM

## 2020-10-14 PROCEDURE — 99213 OFFICE O/P EST LOW 20 MIN: CPT | Performed by: INTERNAL MEDICINE

## 2020-10-14 RX ORDER — SERTRALINE HYDROCHLORIDE 100 MG/1
100 TABLET, FILM COATED ORAL DAILY
Qty: 90 TAB | Refills: 0 | Status: SHIPPED | OUTPATIENT
Start: 2020-10-14 | End: 2021-01-22

## 2020-10-14 RX ORDER — LISINOPRIL 20 MG/1
20 TABLET ORAL DAILY
Qty: 90 TAB | Refills: 1 | Status: SHIPPED | OUTPATIENT
Start: 2020-10-14 | End: 2021-09-23 | Stop reason: SDUPTHER

## 2020-10-14 RX ORDER — MONTELUKAST SODIUM 10 MG/1
TABLET ORAL
Qty: 90 TAB | Refills: 1 | Status: SHIPPED | OUTPATIENT
Start: 2020-10-14 | End: 2021-09-23 | Stop reason: SDUPTHER

## 2020-10-14 NOTE — PROGRESS NOTES
1. Have you been to the ER, urgent care clinic since your last visit? Hospitalized since your last visit? No    2. Have you seen or consulted any other health care providers outside of the [address] [place] [address] since your last visit? Include any pap smears or colon screening.  Yes    Chief Complaint   Patient presents with    Medication Evaluation     follow up       Patient is already on link

## 2020-10-14 NOTE — PROGRESS NOTES
Chief Complaint   Patient presents with    Medication Evaluation     follow up   THIS IS A VIRTUAL VISIT USING [employer]. ME SOFTWARE    Patient Active Problem List    Diagnosis    Dysthymia    COPD mixed type ([facility]     4 liters o2  Sometime 5 liter on his own sat on 5 liters   80      Psoriasis    Polyarthritis     Anxiety worse saw derm new cream seeing pulmonary but due    Social History     Tobacco Use    Smoking status: Former Smoker     Packs/day: 0.10     Types: Cigarettes     Last attempt to quit: 2019     Years since quittin.9    Smokeless tobacco: Never Used    Tobacco comment: 1-2 cigarettes a day, use to smoke up to 2 packs daily   Substance Use Topics    Alcohol use: Yes     Frequency: Monthly or less     Drinks per session: 1 or 2     Binge frequency: Less than monthly     Comment: beer    Drug use: Yes     Types: Marijuana     Comment: eats marijuana cookies     No home BP readingd   No flu shot      Alert with nasal o2 in place    1. COPD mixed type ([facility]  Stay 4 liters talk or see pulmonary    2. Chronic obstructive pulmonary disease, unspecified COPD type ([facility]  worse  - montelukast (SINGULAIR) 10 mg tablet; Take 1 tablet by mouth once daily  Dispense: 90 Tab; Refill: 1    3. Dysthymia  Increase sertraline to 100 mg    4. Essential hypertension  ? ?? Control   Requested Prescriptions     Signed Prescriptions Disp Refills    sertraline (ZOLOFT) 100 mg tablet 90 Tab 0     Sig: Take 1 Tab by mouth daily. Indications: anxiousness associated with depression    lisinopriL (PRINIVIL, ZESTRIL) 20 mg tablet 90 Tab 1     Sig: Take 1 Tab by mouth daily.     montelukast (SINGULAIR) 10 mg tablet 90 Tab 1     Sig: Take 1 tablet by mouth once daily

## 2020-11-27 RX ORDER — ALBUTEROL SULFATE 0.83 MG/ML
2.5 SOLUTION RESPIRATORY (INHALATION)
Qty: 100 EACH | Refills: 5 | Status: SHIPPED | OUTPATIENT
Start: 2020-11-27 | End: 2022-08-30

## 2020-12-22 RX ORDER — BETAMETHASONE DIPROPIONATE 0.5 MG/G
OINTMENT TOPICAL
Qty: 60 G | Refills: 0 | Status: SHIPPED | OUTPATIENT
Start: 2020-12-22 | End: 2021-04-06 | Stop reason: SDUPTHER

## 2021-01-21 ENCOUNTER — VIRTUAL VISIT (OUTPATIENT)
Dept: INTERNAL MEDICINE CLINIC | Age: 66
End: 2021-01-21
Payer: COMMERCIAL

## 2021-01-21 DIAGNOSIS — R30.9 URINARY PAIN: ICD-10-CM

## 2021-01-21 LAB
BILIRUB UR QL STRIP: NORMAL
GLUCOSE UR-MCNC: NEGATIVE MG/DL
KETONES P FAST UR STRIP-MCNC: NORMAL MG/DL
PH UR STRIP: 5.5 [PH] (ref 4.6–8)
PROT UR QL STRIP: NEGATIVE
SP GR UR STRIP: 1.02 (ref 1–1.03)
UA UROBILINOGEN AMB POC: NORMAL (ref 0.2–1)
URINALYSIS CLARITY POC: CLEAR
URINALYSIS COLOR POC: NORMAL
URINE BLOOD POC: NEGATIVE
URINE LEUKOCYTES POC: NEGATIVE
URINE NITRITES POC: NEGATIVE

## 2021-01-21 PROCEDURE — 81002 URINALYSIS NONAUTO W/O SCOPE: CPT | Performed by: PHYSICIAN ASSISTANT

## 2021-01-21 PROCEDURE — 99213 OFFICE O/P EST LOW 20 MIN: CPT | Performed by: PHYSICIAN ASSISTANT

## 2021-01-21 NOTE — PROGRESS NOTES
Chang Robbins is a 63 y.o. male who was seen by synchronous (real-time) audio-video technology on 1/21/2021 for Back Pain (to left side pain is 3 out of 10)        Assessment & Plan:   Diagnoses and all orders for this visit:    1. Mid back pain on left side    Patient's daughter will come to the office to  supplies so that the patient can supply a urine sample. Once the UA  The patient will be contacted with results. Patient will also have a urine culture done. Patient's urine was positive for some bilirubin. I have asked the nurse to contact the patient to remind him not to go back on the Tylenol PM.  I have suggested that patient try over-the-counter Aleve to see if symptoms go away. If he continues to have back pain he would need to be evaluated in the office. Urine does not appear to show infection however we will follow-up urine with a culture and he will be contact with results once they are back  I spent at least 20 minutes on this visit with this established patient. 712  Subjective:   Patient presents for evaluation of left-sided back pain. He reports that it started yesterday. He does not recall doing anything that may have injured the area. Patient states that he is not having any urinary symptoms such as frequency burning or hesitancy. He reports he had symptoms similar to this 1 time before but it was on the other side and he started drinking cranberry juice and it went away. Patient states that he started drinking cranberry juice yesterday and he is actually feeling a little better. Patient reports he does not have a history of kidney stones in the past.  He denies abdominal pain or nausea or vomiting. Prior to Admission medications    Medication Sig Start Date End Date Taking? Authorizing Provider   acetaminophen/diphenhydramine (TYLENOL PM PO) Take  by mouth.    Yes Provider, Historical   betamethasone dipropionate (DIPROLENE) 0.05 % ointment APPLY  OINTMENT TOPICALLY TO AFFECTED AREA TWICE DAILY 12/22/20  Yes Latesha Francis MD   sertraline (ZOLOFT) 100 mg tablet Take 1 Tab by mouth daily. Indications: anxiousness associated with depression 10/14/20  Yes Verona Blum MD   lisinopriL (PRINIVIL, ZESTRIL) 20 mg tablet Take 1 Tab by mouth daily. 10/14/20  Yes Latesha Francis MD   montelukast (SINGULAIR) 10 mg tablet Take 1 tablet by mouth once daily 10/14/20  Yes Latesha Francis MD   hydrocortisone (HYTONE) 2.5 % topical cream Apply  to affected area two (2) times daily as needed (rash). use thin layer 3/26/20  Yes Verona Blum MD   albuterol (VENTOLIN HFA) 90 mcg/actuation inhaler Take 2 Puffs by inhalation every four (4) hours as needed for Wheezing. 12/12/19  Yes Latesha Francis MD   fluticasone-umeclidinium-vilanterol (TRELEGY ELLIPTA) 100-62.5-25 mcg inhaler Take 1 Puff by inhalation daily. Yes Provider, Historical   albuterol (PROVENTIL VENTOLIN) 2.5 mg /3 mL (0.083 %) nebu 3 mL by Nebulization route every four (4) hours as needed for Wheezing. 11/27/20   Latesha Francis MD     Patient Active Problem List    Diagnosis Date Noted    Dysthymia 10/14/2020    COPD mixed type ([facility] [address] 07/16/2019    Psoriasis 07/16/2019    Polyarthritis 07/16/2019     Current Outpatient Medications   Medication Sig Dispense Refill    acetaminophen/diphenhydramine (TYLENOL PM PO) Take  by mouth.  betamethasone dipropionate (DIPROLENE) 0.05 % ointment APPLY  OINTMENT TOPICALLY TO AFFECTED AREA TWICE DAILY 60 g 0    sertraline (ZOLOFT) 100 mg tablet Take 1 Tab by mouth daily. Indications: anxiousness associated with depression 90 Tab 0    lisinopriL (PRINIVIL, ZESTRIL) 20 mg tablet Take 1 Tab by mouth daily. 90 Tab 1    montelukast (SINGULAIR) 10 mg tablet Take 1 tablet by mouth once daily 90 Tab 1    hydrocortisone (HYTONE) 2.5 % topical cream Apply  to affected area two (2) times daily as needed (rash).  use thin layer 60 g 3    albuterol (VENTOLIN HFA) 90 mcg/actuation inhaler Take 2 Puffs by inhalation every four (4) hours as needed for Wheezing. 2 Inhaler 3    fluticasone-umeclidinium-vilanterol (TRELEGY ELLIPTA) 100-62.5-25 mcg inhaler Take 1 Puff by inhalation daily.  albuterol (PROVENTIL VENTOLIN) 2.5 mg /3 mL (0.083 %) nebu 3 mL by Nebulization route every four (4) hours as needed for Wheezing. 100 Each 5     No Known Allergies    ROS  See above  Objective:   No flowsheet data found. General: alert, cooperative, no distress   Mental  status: normal mood, behavior, speech, dress, motor activity, and thought processes, able to follow commands   HENT: NCAT   Neck: no visualized mass   Resp: no respiratory distress   Neuro: no gross deficits   Skin: no discoloration or lesions of concern on visible areas   Psychiatric: normal affect, consistent with stated mood, no evidence of hallucinations     Additional exam findings:   Results for orders placed or performed in visit on 01/21/21   AMB POC URINALYSIS DIP STICK MANUAL W/O MICRO   Result Value Ref Range    Color (UA POC) Mayra     Clarity (UA POC) Clear     Glucose (UA POC) Negative Negative    Bilirubin (UA POC) 1+ Negative    Ketones (UA POC) Trace Negative    Specific gravity (UA POC) 1.025 1.001 - 1.035    Blood (UA POC) Negative Negative    pH (UA POC) 5.5 4.6 - 8.0    Protein (UA POC) Negative Negative    Urobilinogen (UA POC) 1 mg/dL 0.2 - 1    Nitrites (UA POC) Negative Negative    Leukocyte esterase (UA POC) Negative Negative         We discussed the expected course, resolution and complications of the diagnosis(es) in detail. Medication risks, benefits, costs, interactions, and alternatives were discussed as indicated. I advised him to contact the office if his condition worsens, changes or fails to improve as anticipated. He expressed understanding with the diagnosis(es) and plan.        Alexa Keller, who was evaluated through a patient-initiated, synchronous (real-time) audio-video encounter, and/or his healthcare decision maker, is aware that it is a billable service, with coverage as determined by his insurance carrier. He provided verbal consent to proceed: Yes, and patient identification was verified. It was conducted pursuant to the emergency declaration under the [facility], [address] [place] [address] authority and the [employer] General Act. A caregiver was present when appropriate. Ability to conduct physical exam was limited. I was at home. The patient was at home.       Fatoumata Sam PA-C

## 2021-01-21 NOTE — PROGRESS NOTES
Writer contacted patient to inform of urine poc results and instruction/information per Valerie Goyal, patient verbalized understanding. Patient also stated he had been drinking cranberry juice all day and the pain has gone.

## 2021-01-21 NOTE — PROGRESS NOTES
Please contact the patient and let him know that there is no signs of infection.  He does have however have some bilirubin present in his urine.  This has to do with the liver.  Advised him not to go back taking the Tylenol PM.  Advised him that I will contact him with the results of the urine culture once it is back.  Please have him to continue to stay well-hydrated.  Advised him that he may try Aleve over-the-counter.  If symptoms do not resolve he will need to see Dr. Blum.

## 2021-01-21 NOTE — PROGRESS NOTES
1. Have you been to the ER, urgent care clinic since your last visit? Hospitalized since your last visit? No    2. Have you seen or consulted any other health care providers outside of the [address] [place] [address] since your last visit? Include any pap smears or colon screening.  Yes     Chief Complaint   Patient presents with    Back Pain     to left side pain is 3 out of 10     patient states that he is already on the link

## 2021-01-22 LAB
BACTERIA SPEC CULT: NORMAL
SERVICE CMNT-IMP: NORMAL

## 2021-01-22 RX ORDER — SERTRALINE HYDROCHLORIDE 100 MG/1
TABLET, FILM COATED ORAL
Qty: 90 TAB | Refills: 0 | Status: SHIPPED | OUTPATIENT
Start: 2021-01-22 | End: 2021-09-23 | Stop reason: SDUPTHER

## 2021-04-06 RX ORDER — BETAMETHASONE DIPROPIONATE 0.5 MG/G
OINTMENT TOPICAL
Qty: 60 G | Refills: 0 | Status: SHIPPED | OUTPATIENT
Start: 2021-04-06 | End: 2021-10-31

## 2021-04-06 RX ORDER — HYDROCORTISONE 25 MG/G
CREAM TOPICAL
Qty: 60 G | Refills: 3 | Status: SHIPPED | OUTPATIENT
Start: 2021-04-06 | End: 2021-07-13

## 2021-04-06 NOTE — TELEPHONE ENCOUNTER
[phone]       Name of medication and dosage if known: \"hydrocortizone cream 2.5%\"  \"DIP ointment\" \"inhaler\"       Is patient out of this medication (yes/no):yes       Pharmacy name: [facility]     Pharmacy listed in chart? (yes/no): yes   Pharmacy phone number: [phone]           jan

## 2021-07-13 RX ORDER — HYDROCORTISONE 25 MG/G
CREAM TOPICAL
Qty: 56.7 G | Refills: 3 | Status: SHIPPED | OUTPATIENT
Start: 2021-07-13 | End: 2021-09-23 | Stop reason: SDUPTHER

## 2021-09-23 ENCOUNTER — PATIENT MESSAGE (OUTPATIENT)
Dept: INTERNAL MEDICINE CLINIC | Age: 66
End: 2021-09-23

## 2021-09-23 ENCOUNTER — VIRTUAL VISIT (OUTPATIENT)
Dept: INTERNAL MEDICINE CLINIC | Age: 66
End: 2021-09-23
Payer: COMMERCIAL

## 2021-09-23 DIAGNOSIS — F34.1 DYSTHYMIA: ICD-10-CM

## 2021-09-23 DIAGNOSIS — I10 ESSENTIAL HYPERTENSION: Primary | ICD-10-CM

## 2021-09-23 DIAGNOSIS — L40.9 PSORIASIS: ICD-10-CM

## 2021-09-23 DIAGNOSIS — J44.9 CHRONIC OBSTRUCTIVE PULMONARY DISEASE, UNSPECIFIED COPD TYPE (HCC): ICD-10-CM

## 2021-09-23 PROCEDURE — 99213 OFFICE O/P EST LOW 20 MIN: CPT | Performed by: PHYSICIAN ASSISTANT

## 2021-09-23 RX ORDER — HYDROCORTISONE 25 MG/G
CREAM TOPICAL 2 TIMES DAILY
Qty: 56.7 G | Refills: 3 | Status: SHIPPED | OUTPATIENT
Start: 2021-09-23 | End: 2021-11-13

## 2021-09-23 RX ORDER — MONTELUKAST SODIUM 10 MG/1
TABLET ORAL
Qty: 90 TABLET | Refills: 1 | Status: SHIPPED | OUTPATIENT
Start: 2021-09-23 | End: 2022-02-24

## 2021-09-23 RX ORDER — LISINOPRIL 20 MG/1
20 TABLET ORAL DAILY
Qty: 90 TABLET | Refills: 1 | Status: SHIPPED | OUTPATIENT
Start: 2021-09-23 | End: 2022-02-24

## 2021-09-23 RX ORDER — SERTRALINE HYDROCHLORIDE 100 MG/1
100 TABLET, FILM COATED ORAL DAILY
Qty: 90 TABLET | Refills: 1 | Status: SHIPPED | OUTPATIENT
Start: 2021-09-23 | End: 2022-02-24

## 2021-09-23 NOTE — TELEPHONE ENCOUNTER
Printed the [address] card and added to historical immunizations in patient chart. Printed copy was taken to front to be scanned into the patient chart.

## 2021-09-23 NOTE — TELEPHONE ENCOUNTER
From: Harsh Bentley  To: Clifton Sam PA-C  Sent: 9/23/2021 1:50 PM EDT  Subject: Update Medical Information    Attached is the COVID-19 Vaccination Card. Elías Nuñez had the J&J shot on 3/22/21.

## 2021-09-23 NOTE — PROGRESS NOTES
1. Have you been to the ER, urgent care clinic since your last visit? Hospitalized since your last visit? No    2. Have you seen or consulted any other health care providers outside of the [address] [place] [address] since your last visit? Include any pap smears or colon screening. Yes, Pulmonolgy    Health Maintenance Due   Topic Date Due    Hepatitis C Screening  Never done    COVID-19 Vaccine (1) Never done    DTaP/Tdap/Td series (1 - Tdap) Never done    Shingrix Vaccine Age 50> (1 of 2) Never done    Flu Vaccine (1) Never done     Patient needs refills.

## 2021-09-23 NOTE — PROGRESS NOTES
Cruz Brock is a 61 y.o. male who was seen by synchronous (real-time) audio-video technology on 9/23/2021 for Medication Evaluation and Medication Refill        Assessment & Plan:   Diagnoses and all orders for this visit:    1. Essential hypertension  -     lisinopriL (PRINIVIL, ZESTRIL) 20 mg tablet; Take 1 Tablet by mouth daily.  -     CBC WITH AUTOMATED DIFF; Future  -     METABOLIC PANEL, COMPREHENSIVE; Future  -     LIPID PANEL; Future    2. Chronic obstructive pulmonary disease, unspecified COPD type (HCC)  -     montelukast (SINGULAIR) 10 mg tablet; Take 1 tablet by mouth once daily  -     CBC WITH AUTOMATED DIFF; Future    3. Dysthymia  -     sertraline (ZOLOFT) 100 mg tablet; Take 1 Tablet by mouth daily. 4. Psoriasis  -     hydrocortisone (HYTONE) 2.5 % topical cream; Apply  to affected area two (2) times a day. use thin layer    The patient's medication has been refilled today. I put labs in the system for the patient to get done. He states it is difficult to get at the house due to his breathing. He states that he may be able to get them done when he is out to see his pulmonologist on October 12. As of right now the plan is for him to get his labs done at that time. I have also strongly encouraged the patient to reach out to his pulmonologist in reference to his oxygen levels when walking. I advised him not to wait until October 12 to discuss this with his doctor. I spent at least 20 minutes on this visit with this established patient. 712  Subjective:   Patient presents today for follow-up and he is in need of refills of his medication. He reports overall he has been doing okay. The patient states that he is on 5 L of oxygen. He is scheduled to see his pulmonologist on October 12. Patient shares that his O2 levels dropped to about 75 to 79% when he is up doing things such as going to the bathroom.   The patient states that the pulmonologist was supposed to be getting into contact with him in reference to a different oxygen machine for home use. Prior to Admission medications    Medication Sig Start Date End Date Taking? Authorizing Provider   montelukast (SINGULAIR) 10 mg tablet Take 1 tablet by mouth once daily 9/23/21  Yes Brigette Sam PA-C   lisinopriL (PRINIVIL, ZESTRIL) 20 mg tablet Take 1 Tablet by mouth daily. 9/23/21  Yes Brigette Sam PA-C   sertraline (ZOLOFT) 100 mg tablet Take 1 Tablet by mouth daily. 9/23/21  Yes Brigette Sam PA-C   hydrocortisone (HYTONE) 2.5 % topical cream Apply  to affected area two (2) times a day. use thin layer 9/23/21  Yes Brigette Sam PA-C   acetaminophen/diphenhydramine (TYLENOL PM PO) Take  by mouth. Yes Provider, Historical   albuterol (PROVENTIL VENTOLIN) 2.5 mg /3 mL (0.083 %) nebu 3 mL by Nebulization route every four (4) hours as needed for Wheezing. 11/27/20  Yes Penny Man MD   albuterol (VENTOLIN HFA) 90 mcg/actuation inhaler Take 2 Puffs by inhalation every four (4) hours as needed for Wheezing. 12/12/19  Yes Penny Man MD   fluticasone-umeclidinium-vilanterol (TRELEGY ELLIPTA) 100-62.5-25 mcg inhaler Take 1 Puff by inhalation daily. Yes Provider, Historical   hydrocortisone (HYTONE) 2.5 % topical cream APPLY A THIN LAYER TOPICALLY TO AFFECTED AREA TWICE A DAY AS NEEDED 7/13/21 9/23/21  Penny Man MD   betamethasone dipropionate (DIPROLENE) 0.05 % ointment APPLY  OINTMENT TOPICALLY TO AFFECTED AREA TWICE DAILY  Patient not taking: Reported on 9/23/2021 4/6/21   Penny Man MD   sertraline (ZOLOFT) 100 mg tablet TAKE 1 TABLET BY MOUTH EVERY DAY 1/22/21 9/23/21  Penny Man MD   lisinopriL (PRINIVIL, ZESTRIL) 20 mg tablet Take 1 Tab by mouth daily.  10/14/20 9/23/21  Penny Man MD   montelukast (SINGULAIR) 10 mg tablet Take 1 tablet by mouth once daily 10/14/20 9/23/21  Penny Man MD     Patient Active Problem List    Diagnosis Date Noted    Dysthymia 10/14/2020  [facility] 07/16/2019    Psoriasis 07/16/2019    Polyarthritis 07/16/2019     Current Outpatient Medications   Medication Sig Dispense Refill    montelukast (SINGULAIR) 10 mg tablet Take 1 tablet by mouth once daily 90 Tablet 1    lisinopriL (PRINIVIL, ZESTRIL) 20 mg tablet Take 1 Tablet by mouth daily. 90 Tablet 1    sertraline (ZOLOFT) 100 mg tablet Take 1 Tablet by mouth daily. 90 Tablet 1    hydrocortisone (HYTONE) 2.5 % topical cream Apply  to affected area two (2) times a day. use thin layer 56.7 g 3    acetaminophen/diphenhydramine (TYLENOL PM PO) Take  by mouth.  albuterol (PROVENTIL VENTOLIN) 2.5 mg /3 mL (0.083 %) nebu 3 mL by Nebulization route every four (4) hours as needed for Wheezing. 100 Each 5    albuterol (VENTOLIN HFA) 90 mcg/actuation inhaler Take 2 Puffs by inhalation every four (4) hours as needed for Wheezing. 2 Inhaler 3    fluticasone-umeclidinium-vilanterol (TRELEGY ELLIPTA) 100-62.5-25 mcg inhaler Take 1 Puff by inhalation daily.  betamethasone dipropionate (DIPROLENE) 0.05 % ointment APPLY  OINTMENT TOPICALLY TO AFFECTED AREA TWICE DAILY (Patient not taking: Reported on 9/23/2021) 60 g 0     No Known Allergies    ROS    Objective:     Patient-Reported Vitals 9/23/2021   Patient-Reported Weight 250   Patient-Reported Height 6'   Patient-Reported Pulse 71   Patient-Reported Temperature 98.3   Patient-Reported SpO2 95   Patient-Reported Systolic  035   Patient-Reported Diastolic 72      General: alert, cooperative, no distress   Mental  status: normal mood, behavior, speech, dress, motor activity, and thought processes, able to follow commands   HENT: NCAT   Neck: no visualized mass   Resp: no respiratory distress   Neuro: no gross deficits   Skin: no discoloration or lesions of concern on visible areas   Psychiatric: normal affect, consistent with stated mood, no evidence of hallucinations     Additional exam findings:        We discussed the expected course, resolution and complications of the diagnosis(es) in detail. Medication risks, benefits, costs, interactions, and alternatives were discussed as indicated. I advised him to contact the office if his condition worsens, changes or fails to improve as anticipated. He expressed understanding with the diagnosis(es) and plan. Elda Faustin, was evaluated through a synchronous (real-time) audio-video encounter. The patient (or guardian if applicable) is aware that this is a billable service. Verbal consent to proceed has been obtained within the past 12 months. The visit was conducted pursuant to the emergency declaration under the [facility], [id number] waiver authority and the [employer] General Act. Patient identification was verified, and a caregiver was present when appropriate. The patient was located in a state where the provider was credentialed to provide care.     Veronica Sam PA-C

## 2021-10-31 RX ORDER — BETAMETHASONE DIPROPIONATE 0.5 MG/G
OINTMENT TOPICAL
Qty: 60 G | Refills: 0 | Status: SHIPPED | OUTPATIENT
Start: 2021-10-31 | End: 2021-12-21

## 2021-11-12 DIAGNOSIS — L40.9 PSORIASIS: ICD-10-CM

## 2021-11-13 RX ORDER — HYDROCORTISONE 25 MG/G
CREAM TOPICAL
Qty: 56 G | Refills: 3 | Status: SHIPPED | OUTPATIENT
Start: 2021-11-13 | End: 2022-08-28

## 2021-12-21 RX ORDER — BETAMETHASONE DIPROPIONATE 0.5 MG/G
OINTMENT TOPICAL
Qty: 60 G | Refills: 0 | Status: SHIPPED | OUTPATIENT
Start: 2021-12-21 | End: 2022-08-28

## 2022-02-24 DIAGNOSIS — I10 ESSENTIAL HYPERTENSION: ICD-10-CM

## 2022-02-24 DIAGNOSIS — J44.9 CHRONIC OBSTRUCTIVE PULMONARY DISEASE, UNSPECIFIED COPD TYPE (HCC): ICD-10-CM

## 2022-02-24 DIAGNOSIS — F34.1 DYSTHYMIA: ICD-10-CM

## 2022-02-24 RX ORDER — MONTELUKAST SODIUM 10 MG/1
TABLET ORAL
Qty: 90 TABLET | Refills: 1 | Status: SHIPPED | OUTPATIENT
Start: 2022-02-24

## 2022-02-24 RX ORDER — LISINOPRIL 20 MG/1
TABLET ORAL
Qty: 90 TABLET | Refills: 1 | Status: SHIPPED | OUTPATIENT
Start: 2022-02-24

## 2022-02-24 RX ORDER — SERTRALINE HYDROCHLORIDE 100 MG/1
TABLET, FILM COATED ORAL
Qty: 90 TABLET | Refills: 1 | Status: SHIPPED | OUTPATIENT
Start: 2022-02-24

## 2022-03-18 PROBLEM — F34.1 DYSTHYMIA: Status: ACTIVE | Noted: 2020-10-14

## 2022-03-19 PROBLEM — L40.9 PSORIASIS: Status: ACTIVE | Noted: 2019-07-16

## 2022-03-19 PROBLEM — M13.0 POLYARTHRITIS: Status: ACTIVE | Noted: 2019-07-16

## 2022-03-19 PROBLEM — J44.9 COPD MIXED TYPE (HCC): Status: ACTIVE | Noted: 2019-07-16

## 2022-06-21 ENCOUNTER — TELEPHONE (OUTPATIENT)
Dept: INTERNAL MEDICINE CLINIC | Age: 67
End: 2022-06-21

## 2022-06-21 NOTE — TELEPHONE ENCOUNTER
[address] called to see if MD can order a portable oxygen tank for patient until issue with [place] care is fixed.

## 2022-06-23 NOTE — TELEPHONE ENCOUNTER
[employer] HIPPA verified .  Aware and states that pulmonologist is working on order

## 2022-08-16 ENCOUNTER — TELEPHONE (OUTPATIENT)
Dept: INTERNAL MEDICINE CLINIC | Age: 67
End: 2022-08-16

## 2022-08-16 NOTE — TELEPHONE ENCOUNTER
Discharge Instructions  You may not be sure when your baby is sick and needs to see a doctor, especially if this is your first baby.  DO call your clinic if you are worried about your baby s health.  Most clinics have a 24-hour nurse help line. They are able to answer your questions or reach your doctor 24 hours a day. It is best to call your doctor or clinic instead of the hospital. We are here to help you.    Call 911 if your baby:  - Is limp and floppy  - Has  stiff arms or legs or repeated jerking movements  - Arches his or her back repeatedly  - Has a high-pitched cry  - Has bluish skin  or looks very pale    Call your baby s doctor or go to the emergency room right away if your baby:  - Has a high fever: Rectal temperature of 100.4 degrees F (38 degrees C) or higher or underarm temperature of 99 degree F (37.2 C) or higher.  - Has skin that looks yellow, and the baby seems very sleepy.  - Has an infection (redness, swelling, pain) around the umbilical cord or circumcised penis OR bleeding that does not stop after a few minutes.    Call your baby s clinic if you notice:  - A low rectal temperature of (97.5 degrees F or 36.4 degree C).  - Changes in behavior.  For example, a normally quiet baby is very fussy and irritable all day, or an active baby is very sleepy and limp.  - Vomiting. This is not spitting up after feedings, which is normal, but actually throwing up the contents of the stomach.  - Diarrhea (watery stools) or constipation (hard, dry stools that are difficult to pass).  stools are usually quite soft but should not be watery.  - Blood or mucus in the stools.  - Coughing or breathing changes (fast breathing, forceful breathing, or noisy breathing after you clear mucus from the nose).  - Feeding problems with a lot of spitting up.  - Your baby does not want to feed for more than 6 to 8 hours or has fewer diapers than expected in a 24 hour period.  Refer to the feeding log for expected  Jason James called for PT to state they want to schedule an appoint to see about getting information from Dr. Keerthi Jorge about home care and the next steps. number of wet diapers in the first days of life.    If you have any concerns about hurting yourself of the baby, call your doctor right away.      Baby's Birth Weight: 7 lb 8.6 oz (3420 g)  Baby's Discharge Weight: 3.334 kg (7 lb 5.6 oz)    Recent Labs   Lab Test  18   1238   TCBIL  3.0       Immunization History   Administered Date(s) Administered     Hep B, Peds or Adolescent 2018       Hearing Screen Date: 18  Hearing Screen Left Ear Abr (Auditory Brainstem Response): passed  Hearing Screen Right Ear Abr (Auditory Brainstem Response): passed     Umbilical Cord: cord clamp removed  Pulse Oximetry Screen Result: Pass  (right arm): 99 %  (foot): 100 %      Car Seat Testing Results:    Date and Time of  Metabolic Screen: 18 1234   ID Band Number ________  I have checked to make sure that this is my baby.

## 2022-08-16 NOTE — TELEPHONE ENCOUNTER
Patient unable to get out of bed. Gurmeet Pérez calling to inquire on possible in home care and how the process works to have that set up. Please call.

## 2022-08-24 ENCOUNTER — OFFICE VISIT (OUTPATIENT)
Dept: INTERNAL MEDICINE CLINIC | Age: 67
End: 2022-08-24

## 2022-08-24 DIAGNOSIS — J44.9 CHRONIC OBSTRUCTIVE PULMONARY DISEASE, UNSPECIFIED COPD TYPE (HCC): Primary | ICD-10-CM

## 2022-08-24 DIAGNOSIS — Z78.9 DECREASED ACTIVITIES OF DAILY LIVING (ADL): ICD-10-CM

## 2022-08-24 DIAGNOSIS — Z74.01 BEDRIDDEN: ICD-10-CM

## 2022-08-24 NOTE — PROGRESS NOTES
Patient's POA came into the office today requesting a consult with the provider in reference to further assistance on patient's care. Pt POA stated patient has been bed bound since January 2022. Pt has not has been refusing showers/bed baths since April 2022. Pt POA will like to see what the steps are to getting patient into a low-term nursing facility. Pt POA name is Anthony Vitale and can be reached via cell-phone [phone]. MD notified.

## 2022-08-25 ENCOUNTER — TELEPHONE (OUTPATIENT)
Dept: INTERNAL MEDICINE CLINIC | Age: 67
End: 2022-08-25

## 2022-08-25 NOTE — TELEPHONE ENCOUNTER
Caller just wanted this office to know that the home health referral for the patient was not able to go through.  [facility] does not participate in the patient's insurance so they are not able to accept the referral.

## 2022-08-26 ENCOUNTER — PATIENT OUTREACH (OUTPATIENT)
Dept: CASE MANAGEMENT | Age: 67
End: 2022-08-26

## 2022-08-26 NOTE — PROGRESS NOTES
Social Work Note  8/26/2022    Referral received from Dr. Karol Winslow requesting assistance with placement. Chart reviewed. Noted request by Mingo Melton for assistance with placement. Attempted to reach patient for initial assessment. Reached unidentified gentleman when contacting patient's number. He stated that patient was sleeping and that \"they\" needed to discuss \"treatment options\" as patient needs assistance. Advised that SW could not speak with anyone except patient due to HIPAA privacy laws. Lino Britt stated that he will have patient contact SW. Received message from Mingo Melton who stated that she is patient' sister. She requested call to discuss care for patient. Returned call and left HIPAA compliant message on voicemail.      ELIAS Salazar, ACSW, [facility]    Ambulatory Care Management   [phone]

## 2022-08-27 DIAGNOSIS — L40.9 PSORIASIS: ICD-10-CM

## 2022-08-28 RX ORDER — BETAMETHASONE DIPROPIONATE 0.5 MG/G
OINTMENT TOPICAL
Qty: 60 G | Refills: 0 | Status: SHIPPED | OUTPATIENT
Start: 2022-08-28

## 2022-08-28 RX ORDER — HYDROCORTISONE 25 MG/G
CREAM TOPICAL
Qty: 56 G | Refills: 3 | Status: SHIPPED | OUTPATIENT
Start: 2022-08-28

## 2022-08-29 ENCOUNTER — PATIENT OUTREACH (OUTPATIENT)
Dept: CASE MANAGEMENT | Age: 67
End: 2022-08-29

## 2022-08-29 NOTE — PROGRESS NOTES
Initial Contact Social Work Note - Ambulatory  8/30/2022      Date of referral: 08/25/22  Referral received from:  Dr. Caleb Stanley  Reason for referral: Assistance with help at home/placement    Previous SW Referral: No      Two Identifiers Verified: Yes    Insurance Provider: [address] Medicare/Medicaid D-SNP  Care Coordinator at [address] [place] [address] :  Maria Del Carmen Ortega  [phone]    Support System: Sibling(s)      Status: N/A    Community Providers:  Raymond Escobar      ADL Assistance: Bathing, Dressing, Eating, Transferring, and Toileting    Housing/Living Concerns or Home Modification Needs:   Patient lives on home with one level, 6 steps entry. Transportation Concern: Family can provide transportation, but getting to vehicle is difficult due to 6 step entry and limited mobility    Medication Cost Concern: None - covered by insurance    Medication Education or Adherence Concern: None reported. Patient takes independently. Financial Concern(s):  None reported    Income (only if applicable): N/A     Ability to Read/Write: Yes    Advance Care Plan:  Reviewed and confirmed accuracy    Other:  Referral received for assistance with placement. Chart reviewed. Received call from patient's sister re: patient status. Spoke with patient for initial assessment. Verbal consent received from patient for SW to speak with sister, Deisi Izaguirre, and brother-in-law, Phu Hays. Patient stated that he lives with his sister and brother in law. He reported that he does need assistance with care as he gets dizzy now when he attempts to ambulate to the bathroom. He stated that he is usually in bed watching TV now. Patient reported increased in anxiety related to SOB when ambulating and he also worries about falling. Patient stated that he had personal care aide services for a short time last year. Also spoke with sister for assessment. She stated that patient has been declining in health and mobility. She reported that he uses a bedside commode as it is too difficult to reach the bathroom. She stated that he also has difficulty sitting up and she worries about bedsores. She reported that his skin has not been assessed as patient has not been able to get into shower. Discussed options for services. Sister asked about whether patient needs nursing facility care. SW advised that nursing home or personal care aide services are option as patient has Medicaid. Advised that there may be a wait time for an aide at home due to shortage. Discussed option of trying aide services at home and transitioning to nursing facility should additional care be needed. Discussed equipment. Patient is currently using regular bed and per sister, sleeps on pillows. SW advised that patient may qualify for hospital bed. Patient/family voiced interest.   Reviewed need for UAI screening though [facility] of . Advised that paperwork is needed to document patient needs. SW to contact DSS to place referral for screening. Also discussed referral for [place] [address] - skilled nursing, PT, OT to assess patient needs. Reviewed need for virtual visit. Family to contact PCP for appointment. SW asked about Care Coordination services through [facility]. Sister reported that patient's care coordinator is Te Merida ([phone]). Verbal permission granted for SW to contact Ms. Cobb for coordination of services. 8/30/22   left for UAI screening team at [address] [place] [address] ([phone], option 5). Requested return call. Will provide patient information for referral.      left for Te Merida - Care Coordinator at [facility] ([phone]).     Identified Needs:     UAI screening through [facility] for long term care services  [facility], PT, OT    Social Work Plan:    Referral to [facility] for UAI screening  Follow-up with [facility] Coordinator  Assist with coordination of community resources as needed     Method of Communication with Provider (if appropriate): chart routing        Goals Addressed                      This Visit's Progress       General      Long term care services (personal care or nursing facility) (pt-stated)         08/30/22  Patient is in need of assistance in the home by personal care aide services. Medicaid in place. Patient to be screened by [address] [facility] (UAI screening). SW Plan:  Assist with coordination of services as needed.   STEPH Mitchell, MSW, ACSW, [facility]    Ambulatory Care Management   [phone]

## 2022-08-30 ENCOUNTER — TELEPHONE (OUTPATIENT)
Dept: INTERNAL MEDICINE CLINIC | Age: 67
End: 2022-08-30

## 2022-08-30 NOTE — TELEPHONE ENCOUNTER
Called pt. Was told by a family member to call his sister, Jean Carlos Leal, who is POA at [phone]. Called # and left message.

## 2022-08-30 NOTE — ACP (ADVANCE CARE PLANNING)
Advance Care Planning   Healthcare Decision Maker:     Primary Healthcare Decision Maker:  Tejas Berman, sister  ([phone])    ACP on file in chart. Click here to complete [address] including selection of the Healthcare Decision Maker Relationship (ie \"Primary\")  Today we documented Decision Maker(s) consistent with ACP documents on file.      Wen Hood, MSW, ACSW, [facility]    Ambulatory Care Management   [phone]

## 2022-08-31 ENCOUNTER — TELEPHONE (OUTPATIENT)
Dept: FAMILY MEDICINE CLINIC | Age: 67
End: 2022-08-31

## 2022-08-31 ENCOUNTER — PATIENT OUTREACH (OUTPATIENT)
Dept: CASE MANAGEMENT | Age: 67
End: 2022-08-31

## 2022-08-31 NOTE — TELEPHONE ENCOUNTER
Anita brady from [facility] states that they can not accept order for pt Home Health    They do not participate in pt insurance.       Please advise-Please forward to [facility] nurse pool

## 2022-08-31 NOTE — PROGRESS NOTES
Social Work Note  8/31/2022   Goals Addressed                      This Jenny 226 bed (pt-stated)         08/31/22  Spoke with family re: preference for hospital bed DME company. No preference provided. Patient [facility] oxygen with [facility], but they do not provide hospital beds. Reviewed in-network DME providers through insurance. Contacted [facility] and spoke with Selam Santana. Referral information provided. She will send fax request for order/information to PCP for completion. SW Plan:  Follow-up with [facility] re: order. AML        Long term care services (personal care or nursing facility) (pt-stated)         08/31/22  Referral request provided to Arely Perez at [address] [place] [address]. AML    08/30/22  Patient is in need of assistance in the home by personal care aide services. Medicaid in place. Patient to be screened by [address] [place] [address] (UAI screening). SW Plan:  Assist with coordination of services as needed.   AML          ELIAS Salazar, ACSW, [facility]    Ambulatory Care Management   [phone]

## 2022-09-01 ENCOUNTER — PATIENT OUTREACH (OUTPATIENT)
Dept: CASE MANAGEMENT | Age: 67
End: 2022-09-01

## 2022-09-01 NOTE — PROGRESS NOTES
Social Work Note  9/1/2022    Chart reviewed. Noted that [facility] is unable to accept. Reviewed Lucy Robles 7301 provider list.  Malia Senters multiple agencies to determine availability to provide services:    [facility] [address] Jaquan Page advised \"not in "  [facility] - no longer providing skilled care, just Medicaid personal care  [facility] - private duty nursing only  [facility] - message left, no response  [facility] - no skilled care, consumer directed services for Medicaid only  [facility] - reached voicemail, no identifying information, no message left    Contacted [facility] and spoke with Nadeen. Advised of SW inability to locate in-network home health provider. She sent message to patient's Care Coordinator, Shanae Means, who emailed  list of agencies from website. Email sent to Ms. Reza advising that SW has already reached out to these agencies without success. She forwarded request to [employer] President, [facility]  with [facility]. Provided specifics regarding inability to locate provider. She advised that she has escalated situation to Provider Relations. 4:00 pm:  Received updated email from [employer] President providing names of additional home health agencies that she used with company while in different role. Reviewed list.     4:20 pm:  Spoke with [facility]. She advised that they are in network, but would need to review and obtain authorization. Fax number to send referral:  [phone]. Information sent to Dr. Yair Foster with request to send referral to [facility].      WAI Plan:   Follow-up with [facility] to confirm receipt of referral and ability to accept patient

## 2022-09-02 ENCOUNTER — PATIENT OUTREACH (OUTPATIENT)
Dept: CASE MANAGEMENT | Age: 67
End: 2022-09-02

## 2022-09-02 NOTE — TELEPHONE ENCOUNTER
Luther Kaufman called from Ambulatory home health and stated that [address] [place] [address] was checking on a referral from Dr. Charla Lowery in regards to this patient.       The fax number is [phone]    Order numbers and last office note should be included with referral.

## 2022-09-02 NOTE — PROGRESS NOTES
Social Work Note  9/2/2022    12:40 pm   Spoke with [facility]. They have not received referral for services. Spoke with Mague Cowart at Dr. Yuan Casarez office to check on status home health referral.  Information to submit referral to [facility] provided.      Tete Guerrero, MSW, ACSW, [facility]    Ambulatory Care Management   [phone]

## 2022-09-06 ENCOUNTER — TELEPHONE (OUTPATIENT)
Dept: INTERNAL MEDICINE CLINIC | Age: 67
End: 2022-09-06

## 2022-09-06 ENCOUNTER — PATIENT OUTREACH (OUTPATIENT)
Dept: CASE MANAGEMENT | Age: 67
End: 2022-09-06

## 2022-09-06 NOTE — PROGRESS NOTES
Social Work Note  9/6/2022    Confirmed receipt of faxed referral at [facility]. Makenzie at [facility] advised that they need clarification of orders and requested call from PCP office or updated orders faxed. Orders need to specifically list all disciplines requested. Spoke with Dr. Shauna Goncalves office and advised of need for updated orders. Information to be sent tomorrow as PCP is out of the office. Advised Makenzie at [facility] that updated orders will be sent 9/7/22. She advised that request does mention PT, OT so those services will begin tomorrow. She requested clarification and order for skilled nursing if needed. She stated that patient has been contacted re: service start.  Plan:   Follow-up re: start of services  Follow-up re: status of hospital bed.      Stephanie Padgett, MSW, ACSW, [facility]    Ambulatory Care Management   [phone]

## 2022-09-06 NOTE — TELEPHONE ENCOUNTER
Timbo Steel,  from [facility] states that patient has been approved for PT/OT and Skilled Nursing. In [facility] needs an Updated Order stating that patient needs PT and OT as well as Skilled Nursing from Dr. Oneal Shoemaker. It can either be verbally called in to [employer] at [phone] or faxed to [phone].

## 2022-09-08 NOTE — TELEPHONE ENCOUNTER
Verified name and  date of birth with Ramona Parents ( Patients care giver)   She states no longer needing referral. No further action needed at this time.

## 2022-09-13 ENCOUNTER — TELEPHONE (OUTPATIENT)
Dept: INTERNAL MEDICINE CLINIC | Age: 67
End: 2022-09-13

## 2022-09-13 NOTE — TELEPHONE ENCOUNTER
They received home health orders with dx of COPD. However, the 8/30/22 encounter provided does not provide detail of the diagnosis. They are requesting more information.

## 2022-09-16 LAB — CREATININE, EXTERNAL: 0.91

## 2022-09-19 ENCOUNTER — PATIENT OUTREACH (OUTPATIENT)
Dept: CASE MANAGEMENT | Age: 67
End: 2022-09-19

## 2022-09-19 NOTE — LETTER
September 21, 2022    [facility]  Fax:  [phone]  From:  Fidel Barthel, MSW, CROW, [facility] -   Phone:  [phone]    RE:  Hospital Bed Order    Patient:  Saud Garcia  YOB: 1955  Address:  [address] [place] [address] [address]  Phone contact (sister):  Heike Villeda  [phone]    Insurance:    [address] [place] [address]  [facility]  [place]are/[place]icaid  [place] [id number]    Care Coordinator at [address] [place] [address] Dr:  Juan A Baez  [phone]    [address] [place] [address] information per our conversation yesterday. Office note following in separate fax. Hospital bed is needed. PCP:  [facility]. [facility]  Office number:  [phone]  Fax:  [phone]  Please send fax to attention of PCP    Please call me with any questions.       Thanks,  Fidel Barthel, MSW, CROW, [facility]    Ambulatory Care Management   [phone]

## 2022-09-21 ENCOUNTER — TELEPHONE (OUTPATIENT)
Dept: INTERNAL MEDICINE CLINIC | Age: 67
End: 2022-09-21

## 2022-09-21 NOTE — TELEPHONE ENCOUNTER
Natalee Bowser is working on getting Mr. Hassan Failing a hospital bed. She needs the office visit note from 8/30/22 but it has not been signed. Please complete and sign, thanks!

## 2022-09-21 NOTE — TELEPHONE ENCOUNTER
Note is dictated and needs to be typed so that it shows as electronically signed. Viktoria stated that it is complete on his end and that whoever types the notes for dictations needs to complete it.

## 2022-09-22 NOTE — PROGRESS NOTES
Social Work Note     Goals Addressed                      This Jenny 226 bed (pt-stated)   On track      09/22/22  Spoke with Delbert Gustafson at PCP office re: need to fax office note of 8/30/22 to [facility] to document need for hospital bed. Provided contact information to office for Kailyn Talavera at [facility].  Fax: [phone]. Spoke with Kailyn Talavera at [facility] re: clinical notes. Once received, she will send certificate of medical necessity to PCP office for completion. SW Plan:  Follow-up with Kay re: status of bed. AML    09/21/22  Face sheet faxed to Kailyn Talavera at [facility]. AML    09/20/22  Spoke with PCP office. No record of [employer] form found. Contacted [employer] and spoke with Arnav. It does not appear that form was sent. Kailyn Talavera advised that patient's insurance will require authorization. She also advised that hospital beds are on backorder 1-2 weeks. Kailyn Talavera requested patient demographics and clinical note documenting bed need. She stated that she will start certificate of medical need form and send to PCP with ordering information. Attempted to reach patient. No answer. Spoke with patient's sister re: status of hospital bed. Verbal consent provided for release of information for ordering hospital bed. AML    08/31/22  Spoke with family re: preference for hospital bed DME company. No preference provided. Patient has oxygen with [facility], but they do not provide hospital beds. Reviewed in-network DME providers through insurance. Contacted [facility] and spoke with Agatha Clark. Referral information provided. She will send fax request for order/information to PCP for completion. SW Plan:  Follow-up with Kay re: order. AML        Long term care services (personal care or nursing facility) (pt-stated)   On track      09/20/22  Follow-up with sister. Patient has home health PT visiting 2x/week in addition to SN visits.   Patient also has private caregiver assisting during the week. UAI screening for personal care aide services scheduled 9/26/22. SW Plan:  Follow-up with patient/sister following screening. AML    08/31/22  Referral request provided to Martha Hayes at [address] [place] [address]. AML    08/30/22  Patient is in need of assistance in the home by personal care aide services. Medicaid in place. Patient to be screened by [address] [place] [address] (UAI screening). SW Plan:  Assist with coordination of services as needed.   AML          Lali Forrest, MSW, ACSW, [facility]    Ambulatory Care Management   [phone]

## 2022-09-26 ENCOUNTER — PATIENT OUTREACH (OUTPATIENT)
Dept: CASE MANAGEMENT | Age: 67
End: 2022-09-26

## 2022-09-28 NOTE — PROGRESS NOTES
Social Work Note  9/28/2022     Goals Addressed                      This Jenny 226 bed (pt-stated)         09/28/22  Faxed supporting documentation for hospital bed order to [facility] at [facility].  She confirmed on 09/27/22 that it had not been received. She will send certificate of medical necessity to PCP for completion. SW Plan:  Follow-up with [employer] re: status of bed order. AML    09/22/22  Spoke with Indiana Newby at PCP office re: need to fax office note of 8/30/22 to [facility] to document need for hospital bed. Provided contact information to office for [facility] at [facility].  Fax: [phone]. Spoke with [facility] at [facility] re: clinical notes. Once received, she will send certificate of medical necessity to PCP office for completion. SW Plan:  Follow-up with [facility] re: status of bed. AML    09/21/22  Face sheet faxed to [facility] at [facility]. AML    09/20/22  Spoke with PCP office. No record of [employer] form found. Contacted [employer] and spoke with Arnav. It does not appear that form was sent. [facility] advised that patient's insurance will require authorization. She also advised that hospital beds are on backorder 1-2 weeks. [facility] requested patient demographics and clinical note documenting bed need. She stated that she will start certificate of medical need form and send to PCP with ordering information. Attempted to reach patient. No answer. Spoke with patient's sister re: status of hospital bed. Verbal consent provided for release of information for ordering hospital bed. AML    08/31/22  Spoke with family re: preference for hospital bed DME company. No preference provided. Patient has oxygen with [facility], but they do not provide hospital beds. Reviewed in-network DME providers through insurance. Contacted [employer] and spoke with Darion Rojas. Referral information provided.   She will send fax request for order/information to PCP for completion. WAI Plan:  Follow-up with Kay re: order.   ELIAS Aden, ACSW, [facility]    Ambulatory Care Management   [phone]

## 2022-09-29 ENCOUNTER — PATIENT OUTREACH (OUTPATIENT)
Dept: CASE MANAGEMENT | Age: 67
End: 2022-09-29

## 2022-09-29 ENCOUNTER — TELEPHONE (OUTPATIENT)
Dept: INTERNAL MEDICINE CLINIC | Age: 67
End: 2022-09-29

## 2022-09-29 RX ORDER — NYSTATIN AND TRIAMCINOLONE ACETONIDE 100000; 1 [USP'U]/G; MG/G
CREAM TOPICAL 2 TIMES DAILY
Qty: 30 G | Refills: 0 | Status: SHIPPED | OUTPATIENT
Start: 2022-09-29 | End: 2022-10-04 | Stop reason: SDUPTHER

## 2022-09-29 NOTE — TELEPHONE ENCOUNTER
Romeo Mcclain from [facility] states that patient is experiencing a fungal growing rash on groin. She is hoping that the doctor can prescribe nystatin powder for patient.

## 2022-09-29 NOTE — PROGRESS NOTES
Social Work Note  9/29/2022     Goals Addressed                      This Jenny 226 bed (pt-stated)   On track      09/29/22  Confirmed receipt of documentation with Mcia Dunn at [facility].  She stated that she will submit for authorization and may not need CMN. SW Plan:  Follow-up re: status of bed order. AML    09/28/22  Faxed supporting documentation for hospital bed order to Mica Dunn at [facility].  She confirmed on 09/27/22 that it had not been received. She will send certificate of medical necessity to PCP for completion. SW Plan:  Follow-up with [employer] re: status of bed order. AML    09/22/22  Spoke with [facility] at PCP office re: need to fax office note of 8/30/22 to [facility] to document need for hospital bed. Provided contact information to office for Mica Dunn at [facility].  Fax: [phone]. Spoke with Mica Dunn at [facility] re: clinical notes. Once received, she will send certificate of medical necessity to PCP office for completion. SW Plan:  Follow-up with [employer] re: status of bed. AML    09/21/22  Face sheet faxed to Mica Dunn at [facility]. AML    09/20/22  Spoke with PCP office. No record of [employer] form found. Contacted [employer] and spoke with Arnav. It does not appear that form was sent. Mica Dunn advised that patient's insurance will require authorization. She also advised that hospital beds are on backorder 1-2 weeks. Mica Dunn requested patient demographics and clinical note documenting bed need. She stated that she will start certificate of medical need form and send to PCP with ordering information. Attempted to reach patient. No answer. Spoke with patient's sister re: status of hospital bed. Verbal consent provided for release of information for ordering hospital bed. AML    08/31/22  Spoke with family re: preference for hospital bed DME company. No preference provided.   Patient has oxygen with [address], but they do not provide hospital beds. Reviewed in-network DME providers through insurance. Contacted [facility] and spoke with Yanick Espinoza. Referral information provided. She will send fax request for order/information to PCP for completion. SW Plan:  Follow-up with Kay re: order.   STEPH Hood MSW, ACSW, [facility]    Ambulatory Care Management   [phone]

## 2022-09-29 NOTE — TELEPHONE ENCOUNTER
Ordered    Requested Prescriptions     Signed Prescriptions Disp Refills    nystatin-triamcinolone (MYCOLOG II) topical cream 30 g 0     Sig: Apply  to affected area two (2) times a day. Authorizing Provider:  Elina Small

## 2022-10-04 ENCOUNTER — PATIENT OUTREACH (OUTPATIENT)
Dept: CASE MANAGEMENT | Age: 67
End: 2022-10-04

## 2022-10-04 RX ORDER — NYSTATIN AND TRIAMCINOLONE ACETONIDE 100000; 1 [USP'U]/G; MG/G
CREAM TOPICAL 2 TIMES DAILY
Qty: 30 G | Refills: 0 | Status: SHIPPED | OUTPATIENT
Start: 2022-10-04

## 2022-10-04 NOTE — PROGRESS NOTES
Social Work Note  10/4/2022     Goals Addressed                      This Jenny 226 bed (pt-stated)   On track      10/04/22  Spoke with Linda Weir at [employer].  She advised that request for authorization was sent to insurance for bed on 10/2/22. SW Plan:  Follow-up with status of bed order. AML    09/29/22  Confirmed receipt of documentation with Arnav at [employer].  She stated that she will submit for authorization and may not need CMN. SW Plan:  Follow-up re: status of bed order. AML    09/28/22  Faxed supporting documentation for hospital bed order to Linda Weir at [employer].  She confirmed on 09/27/22 that it had not been received. She will send certificate of medical necessity to PCP for completion. SW Plan:  Follow-up with Kay re: status of bed order. AML    09/22/22  Spoke with Harjit Rodríguez at PCP office re: need to fax office note of 8/30/22 to [employer] to document need for hospital bed. Provided contact information to office for Linda Weir at [facility].  Fax: [phone]. Spoke with Linda Weir at [employer] re: clinical notes. Once received, she will send certificate of medical necessity to PCP office for completion. SW Plan:  Follow-up with Kay re: status of bed. AML    09/21/22  Face sheet faxed to Linda Weir at [facility]. AML    09/20/22  Spoke with PCP office. No record of [employer] form found. Contacted [facility] and spoke with Arnav. It does not appear that form was sent. Linda Weir advised that patient's insurance will require authorization. She also advised that hospital beds are on backorder 1-2 weeks. Linda Weir requested patient demographics and clinical note documenting bed need. She stated that she will start certificate of medical need form and send to PCP with ordering information. Attempted to reach patient. No answer. Spoke with patient's sister re: status of hospital bed.   Verbal consent provided for release of information for ordering hospital bed. AML    08/31/22  Spoke with family re: preference for hospital bed DME company. No preference provided. Patient has oxygen with [facility], but they do not provide hospital beds. Reviewed in-network DME providers through insurance. Contacted [facility] and spoke with Ella Sheldon. Referral information provided. She will send fax request for order/information to PCP for completion. SW Plan:  Follow-up with [facility] re: order.   [facility]          Mayda Beauchamp, MSW, ACSW, [facility]    Ambulatory Care Management   [phone]

## 2022-10-04 NOTE — TELEPHONE ENCOUNTER
PCP: Wilman Melo MD    Last appt: 8/30/2022  No future appointments. Requested Prescriptions     Pending Prescriptions Disp Refills    nystatin-triamcinolone (MYCOLOG II) topical cream 30 g 0     Sig: Apply  to affected area two (2) times a day.

## 2022-10-06 ENCOUNTER — TELEPHONE (OUTPATIENT)
Dept: INTERNAL MEDICINE CLINIC | Age: 67
End: 2022-10-06

## 2022-10-06 DIAGNOSIS — Z78.9 DECREASED ACTIVITIES OF DAILY LIVING (ADL): ICD-10-CM

## 2022-10-06 DIAGNOSIS — J44.9 COPD MIXED TYPE (HCC): Primary | ICD-10-CM

## 2022-10-06 NOTE — TELEPHONE ENCOUNTER
Cathie 4  [phone]      Requesting to extend pt's physical therapy to twice a week for four weeks

## 2022-10-10 NOTE — TELEPHONE ENCOUNTER
----- Message from Jeanie Patton sent at 10/5/2022 12:23 PM EDT -----  Subject: Message to Provider    QUESTIONS  Information for Provider? Alexa from [facility] called and ELISABETH is   requesting for them to come in and treat patient and be discharged from   [facility] , she needs H&P, and Order for Evaluation and treatment   and any nursing notes or other important information - fax [phone]   ---------------------------------------------------------------------------  --------------  [employer]  [phone]; OK to leave message on voicemail  ---------------------------------------------------------------------------  --------------  SCRIPT ANSWERS  Relationship to Patient? Third Party  Third Party Type? Home Health Care? Representative Name?  Gagandeep

## 2022-10-11 ENCOUNTER — PATIENT OUTREACH (OUTPATIENT)
Dept: CASE MANAGEMENT | Age: 67
End: 2022-10-11

## 2022-10-18 ENCOUNTER — PATIENT OUTREACH (OUTPATIENT)
Dept: CASE MANAGEMENT | Age: 67
End: 2022-10-18

## 2022-10-18 NOTE — PROGRESS NOTES
Social Work Note  10/18/2022     Goals Addressed                      [address] bed (pt-stated)   On track      10/18/22  Received message from [address] at [facility] advising requesting call to scheduled bed delivery. Returned call and spoke with Catherine. She advised that [address] was unable to reach patient and left message. Provided contact for patient's sister to scheduled delivery. AML    10/11/22  Spoke with Catherine at [facility].  She advised that they have not received update - still in pending status. She advised that ins company usually provides decision by day 14. SW Plan:  Follow-up with status of bed order. AML    10/04/22  Spoke with Catherine at [facility].  She advised that request for authorization was sent to insurance for bed on 10/2/22. SW Plan:  Follow-up with status of bed order. AML    09/29/22  Confirmed receipt of documentation with Arnav at [facility].  She stated that she will submit for authorization and may not need CMN. SW Plan:  Follow-up re: status of bed order. AML    09/28/22  Faxed supporting documentation for hospital bed order to Catherine at [facility].  She confirmed on 09/27/22 that it had not been received. She will send certificate of medical necessity to PCP for completion. SW Plan:  Follow-up with [facility] re: status of bed order. AML    09/22/22  Spoke with Stefan Prakash at PCP office re: need to fax office note of 8/30/22 to [facility] [employer] to document need for hospital bed. Provided contact information to office for Catherine at [facility].  Fax: [phone]. Spoke with Catherine at [facility] re: clinical notes. Once received, she will send certificate of medical necessity to PCP office for completion. SW Plan:  Follow-up with [facility] re: status of bed. AML    09/21/22  Face sheet faxed to Catherine at [facility]. AML    09/20/22  Spoke with PCP office. No record of [facility] form found.   Contacted [facility] and spoke with Arnav. It does not appear that form was sent. Timuria Michael advised that patient's insurance will require authorization. She also advised that hospital beds are on backorder 1-2 weeks. Nadiya Rodriguez requested patient demographics and clinical note documenting bed need. She stated that she will start certificate of medical need form and send to PCP with ordering information. Attempted to reach patient. No answer. Spoke with patient's sister re: status of hospital bed. Verbal consent provided for release of information for ordering hospital bed. [facility]    08/31/22  Spoke with family re: preference for hospital bed DME company. No preference provided. Patient has oxygen with [employer], but they do not provide hospital beds. Reviewed in-network DME providers through insurance. Contacted [facility] and spoke with Diamond Aguero. Referral information provided. She will send fax request for order/information to PCP for completion. SW Plan:  Follow-up with [facility] re: order.   [facility]          Steve Kwong, MSW, ACSW, [facility]    Ambulatory Care Management   [phone]

## 2022-10-19 ENCOUNTER — PATIENT OUTREACH (OUTPATIENT)
Dept: CASE MANAGEMENT | Age: 67
End: 2022-10-19

## 2022-10-19 NOTE — PROGRESS NOTES
Social Work Note  10/19/2022   Goals Addressed                      This [facility] [address] bed (pt-stated)   On track      10/19/22  Spoke with patient's sister. She stated that she spoke with Bri Arroyo at [facility] and requested that bed be put on \"hold\" as the family considers [address] [place] [address]. Confirmed with [facility] that if patient elects hospice, then hospital bed must be delivered through their contracted provider. [employer] message sent to family advising of the hospice requirement. AML    10/18/22  Received message from Bri Arroyo at [facility] advising requesting call to scheduled bed delivery. Returned call and spoke with Tesfaye. She advised that Bri Arroyo was unable to reach patient and left message. Provided contact for patient's sister to scheduled delivery. AML    10/11/22  Spoke with Tesfaye at [facility].  She advised that they have not received update - still in pending status. She advised that ins company usually provides decision by day 14. SW Plan:  Follow-up with status of bed order. AML    10/04/22  Spoke with Tesfaye at [facility].  She advised that request for authorization was sent to insurance for bed on 10/2/22. SW Plan:  Follow-up with status of bed order. AML    09/29/22  Confirmed receipt of documentation with Arnav at [facility].  She stated that she will submit for authorization and may not need CMN. SW Plan:  Follow-up re: status of bed order. AML    09/28/22  Faxed supporting documentation for hospital bed order to Tesfaye at [facility].  She confirmed on 09/27/22 that it had not been received. She will send certificate of medical necessity to PCP for completion. SW Plan:  Follow-up with [facility] re: status of bed order. AML    09/22/22  Spoke with Meagan Tabor at PCP office re: need to fax office note of 8/30/22 to [facility] to document need for hospital bed.   Provided contact information to office for Tavonda at [facility].  Fax: [phone]. Spoke with Jadon Coles at [address] [place] [address] re: clinical notes. Once received, she will send certificate of medical necessity to PCP office for completion. SW Plan:  Follow-up with [facility] re: status of bed. AML    09/21/22  Face sheet faxed to Jadon Coles at [address] [place] [address]. AML    09/20/22  Spoke with PCP office. No record of [facility] form found. Contacted [facility] and spoke with Arnav. It does not appear that form was sent. Jadon Coles advised that patient's insurance will require authorization. She also advised that hospital beds are on backorder 1-2 weeks. Jadon Coles requested patient demographics and clinical note documenting bed need. She stated that she will start certificate of medical need form and send to PCP with ordering information. Attempted to reach patient. No answer. Spoke with patient's sister re: status of hospital bed. Verbal consent provided for release of information for ordering hospital bed. AML    08/31/22  Spoke with family re: preference for hospital bed DME company. No preference provided. Patient has oxygen with [employer], but they do not provide hospital beds. Reviewed in-network DME providers through insurance. Contacted [facility] and spoke with John Cabrera. Referral information provided. She will send fax request for order/information to PCP for completion. SW Plan:  Follow-up with [facility] re: order. AML        Long term care services (personal care or nursing facility) (pt-stated)   On track      10/19/22  Spoke with patient's sister. She stated that patient and family are considering hospice care. Patient continuing with PT at this time. Briefly discussed hospice services. Asked about status of UAI screening. Patient has been screened and received letter of approval.  Discussed options of care with sister:  1) Aide services provided by agency or 2) consumer directed attendant care (where patient/family hire aide that is paid by Medicaid). Advised of waiting period for aides due to shortage and confirmed that aide services are separate from Hospice care. Sister stated that she will review paperwork when she returns home and contact SW if she has questions. SW offered assistance with coordination of services as needed. SW Plan:  Assist with coordination of aide services as needed. [facility]    09/20/22  Follow-up with sister. Patient has home health PT visiting 2x/week in addition to SN visits. Patient also has private caregiver assisting during the week. UAI screening for personal care aide services scheduled 9/26/22. SW Plan:  Follow-up with patient/sister following screening. [facility]    08/31/22  Referral request provided to Hollie Members at [address] [place] [address]. [facility]    08/30/22  Patient is in need of assistance in the home by personal care aide services. Medicaid in place. Patient to be screened by [address] [place] [address] (UAI screening).  Plan:  Assist with coordination of services as needed.   [facility]          Brant Meigs, MSW, ACSW, [facility]    Ambulatory Care Management   [phone]

## 2022-10-20 ENCOUNTER — PATIENT OUTREACH (OUTPATIENT)
Dept: CASE MANAGEMENT | Age: 67
End: 2022-10-20

## 2022-10-20 NOTE — PROGRESS NOTES
Social Work Note  10/20/2022    Received call from Catherine at [facility] advising that they are aware that patient/family has placed hold on bed as they are weighing hospice option. Catherine stated that she will follow-up with patient/sister at beginning of week.     ELIAS Clemens, ACSW, [facility]    Ambulatory Care Management   [phone]

## 2022-10-28 ENCOUNTER — PATIENT OUTREACH (OUTPATIENT)
Dept: CASE MANAGEMENT | Age: 67
End: 2022-10-28

## 2022-10-28 NOTE — PROGRESS NOTES
Social Work Note  10/28/2022     Goals Addressed                      This Jenny 226 bed (pt-stated)   On track      10/28/22  Spoke with patient's sister. She stated that patient will be enrolling in hospice today. She advised that she does not have number from Tesfaye at [address] [place] [address] to advise re: hospice. SW to contact patient's sister on 10/31 to confirm hospice and will cancel bed with [facility] once enrollment in hospice is confirmed. SW Plan:  Follow-up with patient's sister re: bed. AML    10/19/22  Spoke with patient's sister. She stated that she spoke with Carlos Cervantes at [address] [place] [address] and requested that bed be put on \"hold\" as the family considers [address] [place] [address]. Confirmed with [facility] that if patient elects hospice, then hospital bed must be delivered through their contracted provider. [employer] message sent to family advising of the hospice requirement. [facility]    10/18/22  Received message from Carlos Cervantes at [address] [place] [address] advising requesting call to scheduled bed delivery. Returned call and spoke with Tesfaye. She advised that Carlos Cervantes was unable to reach patient and left message. Provided contact for patient's sister to scheduled delivery. [facility]    10/11/22  Spoke with Tesfaye at [address] [place] [address].  She advised that they have not received update - still in pending status. She advised that ins company usually provides decision by day 14.  Plan:  Follow-up with status of bed order. [facility]    10/04/22  Spoke with Tesfaye at [address] [place] [address].  She advised that request for authorization was sent to insurance for bed on 10/2/22. SW Plan:  Follow-up with status of bed order. [facility]    09/29/22  Confirmed receipt of documentation with Arnav at [address] [place] [address].  She stated that she will submit for authorization and may not need CMN. SW Plan:  Follow-up re: status of bed order.   [facility]    09/28/22  Faxed supporting documentation for hospital bed order to Tesfaye at [facility].  She confirmed on 09/27/22 that it had not been received. She will send certificate of medical necessity to PCP for completion. SW Plan:  Follow-up with [facility] re: status of bed order. AML    09/22/22  Spoke with Mohan Sarkar at PCP office re: need to fax office note of 8/30/22 to [facility] to document need for hospital bed. Provided contact information to office for Moses Snyder at [facility].  Fax: [phone]. Spoke with Moses Snyder at [facility] re: clinical notes. Once received, she will send certificate of medical necessity to PCP office for completion. SW Plan:  Follow-up with [facility] re: status of bed. AML    09/21/22  Face sheet faxed to Moses Snyder at [facility]. AML    09/20/22  Spoke with PCP office. No record of [facility] form found. Contacted [employer] and spoke with Arnav. It does not appear that form was sent. Moses Snyder advised that patient's insurance will require authorization. She also advised that hospital beds are on backorder 1-2 weeks. Moses Snyder requested patient demographics and clinical note documenting bed need. She stated that she will start certificate of medical need form and send to PCP with ordering information. Attempted to reach patient. No answer. Spoke with patient's sister re: status of hospital bed. Verbal consent provided for release of information for ordering hospital bed. AML    08/31/22  Spoke with family re: preference for hospital bed DME company. No preference provided. Patient has oxygen with [employer], but they do not provide hospital beds. Reviewed in-network DME providers through insurance. Contacted [employer] and spoke with Gladis Hill. Referral information provided. She will send fax request for order/information to PCP for completion. SW Plan:  Follow-up with [facility] re: order. AML        Long term care services (personal care or nursing facility) (pt-stated)   On track      10/28/22  Spoke with patient's sister.   She stated that they are meeting with Hospice representative today to enroll patient in hospice. SW to confirm finalization of hospice paperwork on 10/31/22 and will transfer care to hospice SW. [facility]    10/19/22  Spoke with patient's sister. She stated that patient and family are considering hospice care. Patient continuing with PT at this time. Briefly discussed hospice services. Asked about status of UAI screening. Patient has been screened and received letter of approval.  Discussed options of care with sister:  1) Aide services provided by agency or 2) consumer directed attendant care (where patient/family hire aide that is paid by Medicaid). Advised of waiting period for aides due to shortage and confirmed that aide services are separate from Hospice care. Sister stated that she will review paperwork when she returns home and contact SW if she has questions. SW offered assistance with coordination of services as needed. SW Plan:  Assist with coordination of aide services as needed. [facility]    09/20/22  Follow-up with sister. Patient has home health PT visiting 2x/week in addition to SN visits. Patient also has private caregiver assisting during the week. UAI screening for personal care aide services scheduled 9/26/22. SW Plan:  Follow-up with patient/sister following screening. [facility]    08/31/22  Referral request provided to Ric Wilson at [address] [facility]. AML    08/30/22  Patient is in need of assistance in the home by personal care aide services. Medicaid in place. Patient to be screened by [address] [facility] (UAI screening). SW Plan:  Assist with coordination of services as needed.   [facility]          Chaparrita Taylor, MSW, ACSW, [facility]    Ambulatory Care Management   [phone]

## 2022-10-31 ENCOUNTER — PATIENT OUTREACH (OUTPATIENT)
Dept: CASE MANAGEMENT | Age: 67
End: 2022-10-31

## 2022-10-31 NOTE — PROGRESS NOTES
Social Work Note  10/31/2022    Attempted to reach patient's sister. VM left requesting return call.     ELIAS Dumont, ACSW, [facility]    Ambulatory Care Management   [phone]

## 2022-11-01 ENCOUNTER — PATIENT OUTREACH (OUTPATIENT)
Dept: CASE MANAGEMENT | Age: 67
End: 2022-11-01

## 2022-11-02 NOTE — PROGRESS NOTES
Social Work Note    Patient has graduated from the Complex Case Management  program on 11/2/22. At this time all Social Work goals have been completed and the patient/family has the ability to self-manage. No further Social Work follow-up scheduled. Patient/family has Social Work contact information for further questions, concerns, or needs. Goals Addressed                      This Visit's Progress       General      COMPLETED: Hospital bed (pt-stated)   On track      11/2/22  Spoke with Claire Gerardo at [facility].  Bed order/delivery cancelled as Hospice will supply bed. AML    11/1/22  Spoke with patient's sister. She advised that they do not need bed through [facility]. Hospice will be providing bed. SW to contact [facility] to cancel order. AML    10/28/22  Spoke with patient's sister. She stated that patient will be enrolling in hospice today. She advised that she does not have number from Claire Gerardo at [facility] to advise re: hospice. SW to contact patient's sister on 10/31 to confirm hospice and will cancel bed with [facility] once enrollment in hospice is confirmed. SW Plan:  Follow-up with patient's sister re: bed. AML    10/19/22  Spoke with patient's sister. She stated that she spoke with Spenser Miranda at [facility] and requested that bed be put on \"hold\" as the family considers [address] [place] [address]. Confirmed with [facility] that if patient elects hospice, then hospital bed must be delivered through their contracted provider. [employer] message sent to family advising of the hospice requirement. AML    10/18/22  Received message from Spenser Miranda at [facility] advising requesting call to scheduled bed delivery. Returned call and spoke with Claire Gerardo. She advised that Spenser Miranda was unable to reach patient and left message. Provided contact for patient's sister to scheduled delivery.   AML    10/11/22  Spoke with Claire Gerardo at [facility].  She advised that they have not received update - still in pending status. She advised that ins company usually provides decision by day 14. SW Plan:  Follow-up with status of bed order. AML    10/04/22  Spoke with Marval Holter at [facility].  She advised that request for authorization was sent to insurance for bed on 10/2/22. SW Plan:  Follow-up with status of bed order. AML    09/29/22  Confirmed receipt of documentation with Arnav at [facility].  She stated that she will submit for authorization and may not need CMN. SW Plan:  Follow-up re: status of bed order. AML    09/28/22  Faxed supporting documentation for hospital bed order to Marval Holter at [facility].  She confirmed on 09/27/22 that it had not been received. She will send certificate of medical necessity to PCP for completion.  Plan:  Follow-up with [facility] re: status of bed order. AML    09/22/22  Spoke with Luis Reza at PCP office re: need to fax office note of 8/30/22 to [facility] to document need for hospital bed. Provided contact information to office for Marval Holter at [facility].  Fax: [phone]. Spoke with Marval Holter at [facility] re: clinical notes. Once received, she will send certificate of medical necessity to PCP office for completion.  Plan:  Follow-up with [facility] re: status of bed. AML    09/21/22  Face sheet faxed to Marval Holter at [facility]. AML    09/20/22  Spoke with PCP office. No record of [facility] form found. Contacted [facility] and spoke with Arnav. It does not appear that form was sent. Marval Holter advised that patient's insurance will require authorization. She also advised that hospital beds are on backorder 1-2 weeks. Marval Holter requested patient demographics and clinical note documenting bed need. She stated that she will start certificate of medical need form and send to PCP with ordering information. Attempted to reach patient. No answer. Spoke with patient's sister re: status of hospital bed.   Verbal consent provided for release of information for ordering hospital bed. AML    08/31/22  Spoke with family re: preference for hospital bed DME company. No preference provided. [facility], but they do not provide hospital beds. Reviewed in-network DME providers through insurance. Contacted [employer] and spoke with Mary Saez. Referral information provided. She will send fax request for order/information to PCP for completion. SW Plan:  Follow-up with [employer] re: order. AML        COMPLETED: Long term care services (personal care or nursing facility) (pt-stated)   On track      11/1/22  Spoke with patient's sister. She advised that patient has enrolled with [facility]. This SW advised that patient will be closed to care management and that Hospice SW will continue to provide support/assistance. AML    10/31/22  Follow-up call to patient's sister. VM left requesting return call. AML    10/28/22  Spoke with patient's sister. She stated that they are meeting with Hospice representative today to enroll patient in hospice. SW to confirm finalization of hospice paperwork on 10/31/22 and will transfer care to hospice SW. [facility]    10/19/22  Spoke with patient's sister. She stated that patient and family are considering hospice care. Patient continuing with PT at this time. Briefly discussed hospice services. Asked about status of UAI screening. Patient has been screened and received letter of approval.  Discussed options of care with sister:  1) Aide services provided by agency or 2) consumer directed attendant care (where patient/family hire aide that is paid by Medicaid). Advised of waiting period for aides due to shortage and confirmed that aide services are separate from Hospice care. Sister stated that she will review paperwork when she returns home and contact SW if she has questions. SW offered assistance with coordination of services as needed. SW Plan:  Assist with coordination of aide services as needed. [facility]    09/20/22  Follow-up with sister. Patient has home health PT visiting 2x/week in addition to SN visits. Patient also has private caregiver assisting during the week. UAI screening for personal care aide services scheduled 9/26/22. SW Plan:  Follow-up with patient/sister following screening. [facility]    08/31/22  Referral request provided to Hollie Members at [address] [place] [address]. [facility]    08/30/22  Patient is in need of assistance in the home by personal care aide services. Medicaid in place. Patient to be screened by [address] [place] [address] (UAI screening). SW Plan:  Assist with coordination of services as needed.   [facility]          Brant Meigs, MSW, ACSW, [facility]    Ambulatory Care Management   [phone]

## 2023-05-20 RX ORDER — SERTRALINE HYDROCHLORIDE 100 MG/1
1 TABLET, FILM COATED ORAL DAILY
COMMUNITY
Start: 2022-02-24

## 2023-05-20 RX ORDER — LISINOPRIL 20 MG/1
1 TABLET ORAL DAILY
COMMUNITY
Start: 2022-02-24

## 2023-05-20 RX ORDER — BETAMETHASONE DIPROPIONATE 0.05 %
OINTMENT (GRAM) TOPICAL 2 TIMES DAILY
COMMUNITY
Start: 2022-08-28

## 2023-05-20 RX ORDER — MONTELUKAST SODIUM 10 MG/1
1 TABLET ORAL DAILY
COMMUNITY
Start: 2022-02-24

## 2023-05-20 RX ORDER — ALBUTEROL SULFATE 90 UG/1
2 AEROSOL, METERED RESPIRATORY (INHALATION) EVERY 4 HOURS PRN
COMMUNITY
Start: 2019-12-12